# Patient Record
Sex: FEMALE | Race: OTHER | NOT HISPANIC OR LATINO
[De-identification: names, ages, dates, MRNs, and addresses within clinical notes are randomized per-mention and may not be internally consistent; named-entity substitution may affect disease eponyms.]

---

## 2017-10-19 PROBLEM — Z00.00 ENCOUNTER FOR PREVENTIVE HEALTH EXAMINATION: Status: ACTIVE | Noted: 2017-10-19

## 2017-10-24 ENCOUNTER — APPOINTMENT (OUTPATIENT)
Dept: VASCULAR SURGERY | Facility: CLINIC | Age: 62
End: 2017-10-24
Payer: COMMERCIAL

## 2017-10-24 VITALS
HEART RATE: 79 BPM | BODY MASS INDEX: 32.97 KG/M2 | HEIGHT: 59 IN | SYSTOLIC BLOOD PRESSURE: 111 MMHG | WEIGHT: 163.56 LBS | OXYGEN SATURATION: 99 % | DIASTOLIC BLOOD PRESSURE: 75 MMHG

## 2017-10-24 DIAGNOSIS — Z86.39 PERSONAL HISTORY OF OTHER ENDOCRINE, NUTRITIONAL AND METABOLIC DISEASE: ICD-10-CM

## 2017-10-24 DIAGNOSIS — Z84.89 FAMILY HISTORY OF OTHER SPECIFIED CONDITIONS: ICD-10-CM

## 2017-10-24 DIAGNOSIS — Z82.49 FAMILY HISTORY OF ISCHEMIC HEART DISEASE AND OTHER DISEASES OF THE CIRCULATORY SYSTEM: ICD-10-CM

## 2017-10-24 DIAGNOSIS — Z81.8 FAMILY HISTORY OF OTHER MENTAL AND BEHAVIORAL DISORDERS: ICD-10-CM

## 2017-10-24 DIAGNOSIS — Z82.0 FAMILY HISTORY OF EPILEPSY AND OTHER DISEASES OF THE NERVOUS SYSTEM: ICD-10-CM

## 2017-10-24 DIAGNOSIS — Z83.3 FAMILY HISTORY OF DIABETES MELLITUS: ICD-10-CM

## 2017-10-24 PROCEDURE — 29580 STRAPPING UNNA BOOT: CPT

## 2017-10-24 PROCEDURE — 93970 EXTREMITY STUDY: CPT

## 2017-10-24 PROCEDURE — 99244 OFF/OP CNSLTJ NEW/EST MOD 40: CPT | Mod: 25

## 2017-10-31 ENCOUNTER — APPOINTMENT (OUTPATIENT)
Dept: VASCULAR SURGERY | Facility: CLINIC | Age: 62
End: 2017-10-31
Payer: COMMERCIAL

## 2017-10-31 VITALS
DIASTOLIC BLOOD PRESSURE: 86 MMHG | HEART RATE: 76 BPM | SYSTOLIC BLOOD PRESSURE: 134 MMHG | OXYGEN SATURATION: 98 % | TEMPERATURE: 98.2 F

## 2017-10-31 PROCEDURE — 29580 STRAPPING UNNA BOOT: CPT | Mod: RT

## 2017-11-07 ENCOUNTER — APPOINTMENT (OUTPATIENT)
Dept: VASCULAR SURGERY | Facility: CLINIC | Age: 62
End: 2017-11-07
Payer: COMMERCIAL

## 2017-11-07 VITALS
DIASTOLIC BLOOD PRESSURE: 74 MMHG | HEART RATE: 64 BPM | SYSTOLIC BLOOD PRESSURE: 121 MMHG | OXYGEN SATURATION: 97 % | TEMPERATURE: 97.7 F

## 2017-11-07 DIAGNOSIS — I83.012 VARICOSE VEINS OF RIGHT LOWER EXTREMITY WITH ULCER OF CALF: ICD-10-CM

## 2017-11-07 DIAGNOSIS — M79.89 OTHER SPECIFIED SOFT TISSUE DISORDERS: ICD-10-CM

## 2017-11-07 PROCEDURE — 29580 STRAPPING UNNA BOOT: CPT | Mod: RT

## 2017-11-14 ENCOUNTER — APPOINTMENT (OUTPATIENT)
Dept: VASCULAR SURGERY | Facility: CLINIC | Age: 62
End: 2017-11-14
Payer: COMMERCIAL

## 2017-11-14 VITALS
SYSTOLIC BLOOD PRESSURE: 120 MMHG | OXYGEN SATURATION: 98 % | DIASTOLIC BLOOD PRESSURE: 75 MMHG | HEART RATE: 73 BPM | TEMPERATURE: 97.9 F

## 2017-11-14 DIAGNOSIS — L97.208 VARICOSE VEINS OF UNSPECIFIED LOWER EXTREMITY WITH ULCER OF CALF: ICD-10-CM

## 2017-11-14 DIAGNOSIS — M79.661 PAIN IN RIGHT LOWER LEG: ICD-10-CM

## 2017-11-14 DIAGNOSIS — I83.002 VARICOSE VEINS OF UNSPECIFIED LOWER EXTREMITY WITH ULCER OF CALF: ICD-10-CM

## 2017-11-14 DIAGNOSIS — M79.89 PAIN IN RIGHT LOWER LEG: ICD-10-CM

## 2017-11-14 PROCEDURE — 29580 STRAPPING UNNA BOOT: CPT | Mod: RT

## 2017-11-21 ENCOUNTER — LABORATORY RESULT (OUTPATIENT)
Age: 62
End: 2017-11-21

## 2017-11-21 ENCOUNTER — APPOINTMENT (OUTPATIENT)
Dept: VASCULAR SURGERY | Facility: CLINIC | Age: 62
End: 2017-11-21
Payer: COMMERCIAL

## 2017-11-21 VITALS
OXYGEN SATURATION: 98 % | DIASTOLIC BLOOD PRESSURE: 72 MMHG | TEMPERATURE: 98.4 F | HEART RATE: 75 BPM | SYSTOLIC BLOOD PRESSURE: 122 MMHG

## 2017-11-21 PROCEDURE — 37765 STAB PHLEB VEINS XTR 10-20: CPT | Mod: RT

## 2017-12-05 ENCOUNTER — APPOINTMENT (OUTPATIENT)
Dept: VASCULAR SURGERY | Facility: CLINIC | Age: 62
End: 2017-12-05
Payer: COMMERCIAL

## 2017-12-05 VITALS
TEMPERATURE: 98.6 F | HEART RATE: 91 BPM | DIASTOLIC BLOOD PRESSURE: 73 MMHG | SYSTOLIC BLOOD PRESSURE: 111 MMHG | OXYGEN SATURATION: 100 %

## 2017-12-05 DIAGNOSIS — L97.211 VARICOSE VEINS OF RIGHT LOWER EXTREMITY WITH ULCER OF CALF: ICD-10-CM

## 2017-12-05 DIAGNOSIS — I83.012 VARICOSE VEINS OF RIGHT LOWER EXTREMITY WITH ULCER OF CALF: ICD-10-CM

## 2017-12-05 PROCEDURE — 99024 POSTOP FOLLOW-UP VISIT: CPT

## 2018-01-04 ENCOUNTER — APPOINTMENT (OUTPATIENT)
Dept: SURGERY | Facility: CLINIC | Age: 63
End: 2018-01-04

## 2018-02-27 ENCOUNTER — APPOINTMENT (OUTPATIENT)
Dept: SURGERY | Facility: CLINIC | Age: 63
End: 2018-02-27
Payer: COMMERCIAL

## 2018-02-27 VITALS
BODY MASS INDEX: 34.22 KG/M2 | DIASTOLIC BLOOD PRESSURE: 81 MMHG | SYSTOLIC BLOOD PRESSURE: 132 MMHG | OXYGEN SATURATION: 98 % | HEIGHT: 58 IN | WEIGHT: 163 LBS | TEMPERATURE: 98 F

## 2018-02-27 PROCEDURE — 99203 OFFICE O/P NEW LOW 30 MIN: CPT

## 2018-03-19 ENCOUNTER — APPOINTMENT (OUTPATIENT)
Dept: SURGERY | Facility: CLINIC | Age: 63
End: 2018-03-19

## 2018-04-02 ENCOUNTER — APPOINTMENT (OUTPATIENT)
Dept: SURGERY | Facility: CLINIC | Age: 63
End: 2018-04-02
Payer: COMMERCIAL

## 2018-04-02 VITALS
HEIGHT: 58 IN | HEART RATE: 76 BPM | WEIGHT: 161 LBS | SYSTOLIC BLOOD PRESSURE: 110 MMHG | OXYGEN SATURATION: 98 % | BODY MASS INDEX: 33.8 KG/M2 | TEMPERATURE: 98.6 F | DIASTOLIC BLOOD PRESSURE: 75 MMHG

## 2018-04-02 DIAGNOSIS — F15.90 OTHER STIMULANT USE, UNSPECIFIED, UNCOMPLICATED: ICD-10-CM

## 2018-04-02 PROCEDURE — 99214 OFFICE O/P EST MOD 30 MIN: CPT

## 2018-04-06 PROBLEM — F15.90 CAFFEINE USE: Status: ACTIVE | Noted: 2018-02-27

## 2018-06-04 ENCOUNTER — APPOINTMENT (OUTPATIENT)
Dept: SURGERY | Facility: CLINIC | Age: 63
End: 2018-06-04
Payer: COMMERCIAL

## 2018-06-04 VITALS
SYSTOLIC BLOOD PRESSURE: 128 MMHG | BODY MASS INDEX: 33.85 KG/M2 | DIASTOLIC BLOOD PRESSURE: 79 MMHG | OXYGEN SATURATION: 96 % | HEIGHT: 58 IN | TEMPERATURE: 97.9 F | HEART RATE: 67 BPM | WEIGHT: 161.25 LBS

## 2018-06-04 PROCEDURE — 99214 OFFICE O/P EST MOD 30 MIN: CPT

## 2019-09-16 ENCOUNTER — LABORATORY RESULT (OUTPATIENT)
Age: 64
End: 2019-09-16

## 2019-09-16 ENCOUNTER — APPOINTMENT (OUTPATIENT)
Dept: SURGERY | Facility: CLINIC | Age: 64
End: 2019-09-16
Payer: COMMERCIAL

## 2019-09-16 VITALS
BODY MASS INDEX: 33.85 KG/M2 | HEIGHT: 58 IN | HEART RATE: 69 BPM | DIASTOLIC BLOOD PRESSURE: 79 MMHG | WEIGHT: 161.25 LBS | OXYGEN SATURATION: 95 % | TEMPERATURE: 97.8 F | SYSTOLIC BLOOD PRESSURE: 133 MMHG

## 2019-09-16 DIAGNOSIS — E88.1 LIPODYSTROPHY, NOT ELSEWHERE CLASSIFIED: ICD-10-CM

## 2019-09-16 PROCEDURE — 10121 INC&RMVL FB SUBQ TISS COMP: CPT

## 2019-10-02 PROBLEM — E88.1 ACQUIRED GENERALIZED LIPODYSTROPHY: Status: ACTIVE | Noted: 2017-11-07

## 2019-10-02 NOTE — PHYSICAL EXAM
[JVD] : no jugular venous distention  [Normal Breath Sounds] : Normal breath sounds [Normal Heart Sounds] : normal heart sounds [Abdominal Masses] : No abdominal masses [Abdomen Tenderness] : ~T ~M No abdominal tenderness [Tender] : was nontender [Enlarged] : not enlarged [Alert] : alert [Oriented to Person] : oriented to person [Oriented to Place] : oriented to place [Calm] : calm [Oriented to Time] : oriented to time [de-identified] : DANG. Bonifacio. Appropriate. Comfortable. [de-identified] : Pupil reactive. She has a prosthetic globe. No Scleral Icterus. NCAT. [de-identified] : Supple. Trachea midline. No overt lymphadenopathy. No JVD [de-identified] : Soft, non tender and non distended. 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 No overt cellulitis. Massive incisional hernia with poor skin quality and pannus.

## 2019-10-02 NOTE — ASSESSMENT
[FreeTextEntry1] : 63 year old female with complicated recurrent incisional hernia with diabetes, morbid obesity and unfavorable skin configuration. I commended her on her efforts and counseled her on the diabetes management however she has been unsuccessful in mitigating risk factors since last year.  She is working on this. We will be looking forward to her next HGbA1c. In terms of her weight loss I have re- connected her with our nutritionist here today Adore Laguna. We did discuss that risk mitigation is very important. Today on exam I noted a small piece of foreign body associated with one of the sinues. I have taken the opportunity to sample and culture as this data may one day be important should she develop illness related to the sinues. . I answered all questions. She asked me to speak with her sister on the telephone today which I did in her presence. \par \par Procedure:\par Verbal consent obtained from patient\par Discussed risk,benefits\par Site personal time out\par Area prepped with alcohol at draining sinus.\par Patient without sensation in the area. No local used.\par Small protrustion of prosthetic mesh removed sharply with #11Blade\par Sent for Cx\par Some purulence extruded \par \par She expressed understanding. Notably greater than 50% of todays 45 minute follow up visit was spent on counseling and coordination of her care. She knows she needs to lose signficant weight and get back on track with her diabetes management. Her current risk of surgical complication approaches 90 percent for elective reconstruction.

## 2019-10-02 NOTE — HISTORY OF PRESENT ILLNESS
[de-identified] : This is a 62 year old female with multiple medical comorbid conditions including morbid obesity and very poorly controlled diabetes, not on insulin. She was seen here by Dr. Smith who is no longer with the practice having moved to [place]. She is here to seek consultation from me. She reports a protracted and prolonged history of drainig sinus of the anterior abdominal wall with multiple prior repairs in the past, all having failed. She has had a chronic sinus, infection for years. She was sent for CT scan by Dr. Smith, and I am able to review the report however no images have been provided to me. Overall she reports normal bowel function. She reports she has been turned away at other surgical practices for treatment as she has been deemed high risk. Today we spent the majority of the visit discussing his and risk mitigation.. [de-identified] : June 4th, 2018. Patient seen and examined. In the interning time, I did discuss her care plan with her PCP. He has started her on insulin. It is still too early to tell how she is doing with regard to her HGbA1c however the patient reports that her fingersticks have been better controlled. Currently she is missing her equipment and will be ordering more. With regard to her weight she discussed a lower carb diet with our nutritionist but admittedly has not been strict. She returns today and unfortunately has not lost any weight. With regard to the hernia, she has no new issues. The hernia continued to drain, and she does not have any obstructive symptoms. \par \par September 16th, 2019. Patient returns today. She reports that she has not been as compliant with antidiabetic therapy as she could be, has been stressed with work and with personal responsibilities. No obstructive symptoms. She has been unable to lose weight or otherwise reduce risk factors for surgery. She continues to have drainage from the chronic sinus/fistula to her mesh in the abdominal wall.

## 2021-04-19 ENCOUNTER — RESULT REVIEW (OUTPATIENT)
Age: 66
End: 2021-04-19

## 2021-04-19 ENCOUNTER — APPOINTMENT (OUTPATIENT)
Dept: SURGERY | Facility: CLINIC | Age: 66
End: 2021-04-19
Payer: MEDICARE

## 2021-04-19 VITALS
SYSTOLIC BLOOD PRESSURE: 148 MMHG | HEIGHT: 58 IN | BODY MASS INDEX: 38.07 KG/M2 | WEIGHT: 181.38 LBS | HEART RATE: 107 BPM | TEMPERATURE: 97.2 F | OXYGEN SATURATION: 97 % | DIASTOLIC BLOOD PRESSURE: 91 MMHG

## 2021-04-19 DIAGNOSIS — R10.9 UNSPECIFIED ABDOMINAL PAIN: ICD-10-CM

## 2021-04-19 DIAGNOSIS — L90.5 SCAR CONDITIONS AND FIBROSIS OF SKIN: ICD-10-CM

## 2021-04-19 PROCEDURE — 99213 OFFICE O/P EST LOW 20 MIN: CPT

## 2021-04-20 ENCOUNTER — OUTPATIENT (OUTPATIENT)
Dept: OUTPATIENT SERVICES | Facility: HOSPITAL | Age: 66
LOS: 1 days | End: 2021-04-20
Payer: MEDICARE

## 2021-04-20 ENCOUNTER — APPOINTMENT (OUTPATIENT)
Dept: CT IMAGING | Facility: HOSPITAL | Age: 66
End: 2021-04-20
Payer: MEDICARE

## 2021-04-20 PROCEDURE — 74177 CT ABD & PELVIS W/CONTRAST: CPT

## 2021-04-20 PROCEDURE — G1004: CPT

## 2021-04-20 PROCEDURE — 74177 CT ABD & PELVIS W/CONTRAST: CPT | Mod: 26,ME

## 2021-04-21 ENCOUNTER — APPOINTMENT (OUTPATIENT)
Dept: BARIATRICS | Facility: CLINIC | Age: 66
End: 2021-04-21
Payer: MEDICARE

## 2021-04-21 VITALS
WEIGHT: 181 LBS | TEMPERATURE: 97.2 F | HEIGHT: 58 IN | HEART RATE: 79 BPM | DIASTOLIC BLOOD PRESSURE: 81 MMHG | OXYGEN SATURATION: 97 % | SYSTOLIC BLOOD PRESSURE: 133 MMHG | BODY MASS INDEX: 37.99 KG/M2

## 2021-04-21 DIAGNOSIS — T85.79XA INFECTION AND INFLAMMATORY REACTION DUE TO OTHER INTERNAL PROSTHETIC DEVICES, IMPLANTS AND GRAFTS, INITIAL ENCOUNTER: ICD-10-CM

## 2021-04-21 PROCEDURE — 99213 OFFICE O/P EST LOW 20 MIN: CPT

## 2021-04-29 NOTE — HISTORY OF PRESENT ILLNESS
[de-identified] : This is a 62 year old female with multiple medical comorbid conditions including morbid obesity and very poorly controlled diabetes, not on insulin. She was seen here by Dr. Smith who is no longer with the practice having moved to [place]. She is here to seek consultation from me. She reports a protracted and prolonged history of drainig sinus of the anterior abdominal wall with multiple prior repairs in the past, all having failed. She has had a chronic sinus, infection for years. She was sent for CT scan by Dr. Smith, and I am able to review the report however no images have been provided to me. Overall she reports normal bowel function. She reports she has been turned away at other surgical practices for treatment as she has been deemed high risk. Today we spent the majority of the visit discussing his and risk mitigation.. [de-identified] : June 4th, 2018. Patient seen and examined. In the interning time, I did discuss her care plan with her PCP. He has started her on insulin. It is still too early to tell how she is doing with regard to her HGbA1c however the patient reports that her fingersticks have been better controlled. Currently she is missing her equipment and will be ordering more. With regard to her weight she discussed a lower carb diet with our nutritionist but admittedly has not been strict. She returns today and unfortunately has not lost any weight. With regard to the hernia, she has no new issues. The hernia continued to drain, and she does not have any obstructive symptoms. \par \par September 16th, 2019. Patient returns today. She reports that she has not been as compliant with antidiabetic therapy as she could be, has been stressed with work and with personal responsibilities. No obstructive symptoms. She has been unable to lose weight or otherwise reduce risk factors for surgery. She continues to have drainage from the chronic sinus/fistula to her mesh in the abdominal wall. \par \par 4- - Patient returns today. Reports having had a difficult year of pandemic. Lost her mother and her brother recently. Now is living alone. Also was forced into earlier group home than she initially  has planned. Continues to struggle with weight. Continues to have issue related to mesh infection and loss of abdominal domain. Weight heaing in wrong direction. She continues to work on her diabetes.

## 2021-04-29 NOTE — ASSESSMENT
[FreeTextEntry1] : 63 year old female with complicated recurrent incisional hernia with diabetes, morbid obesity and unfavorable skin configuration. I commended her on her efforts and counseled her on the diabetes management however she has been unsuccessful in mitigating risk factors since last year.  She is working on this. \par \par Discussed that weight loss is imperative as at current weight massive abdominal wall reconstruction is very risky. Will consider weight loss surgery.

## 2021-04-29 NOTE — PHYSICAL EXAM
[Normal Breath Sounds] : Normal breath sounds [Normal Heart Sounds] : normal heart sounds [Alert] : alert [Oriented to Person] : oriented to person [Oriented to Place] : oriented to place [Oriented to Time] : oriented to time [Calm] : calm [JVD] : no jugular venous distention  [Abdominal Masses] : No abdominal masses [Abdomen Tenderness] : ~T ~M No abdominal tenderness [Tender] : was nontender [Enlarged] : not enlarged [de-identified] : DANG. Bonifacio. Appropriate. Comfortable. [de-identified] : Pupil reactive. She has a prosthetic globe. No Scleral Icterus. NCAT. [de-identified] : Supple. Trachea midline. No overt lymphadenopathy. No JVD [de-identified] : Soft, non tender and non distended. 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 No overt cellulitis. Massive incisional hernia with poor skin quality and pannus.

## 2021-05-01 NOTE — PHYSICAL EXAM
[Obese, well nourished, in no acute distress] : obese, well nourished, in no acute distress [Normal] : affect appropriate [de-identified] : obese, giant ventral hernia involving her entire left side of her abdomen with complete loss of domain, oozing fistula on right side of the abdomen with purulent drainage (form chronically infected mesh) [de-identified] : as above

## 2021-05-01 NOTE — HISTORY OF PRESENT ILLNESS
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 She is referred to bariatric surgery for potential weight reduction to increase domain to allow body wall reconstruction. While we completely agree with these general principles, we believe that the severity, loss of domain and absence of muscle structure to anchor to makes it highly unlikely that we will be able to accomplish these goals. Additionally, on exam, Sylvia actually has very little fat deposition in her arms and legs, already is having kyphoscoliosis of her spine and has the distribution of having effects of insulin therapy. Remarkably, she is able to walk, lead an active lifestyle, and her major complaint is the dependency of her intraabdominal cavity on her lower extremities. I think surgical intervention with a sleeve gastrectomy alone would probably have nominal weight loss and to add an intestinal conduit wouldn't be beneficial because I think her bone structure is already compromised. As we have explained to her and her sister, we believe that any surgical intervention would at least have an even chance of making her worse and causing problems with wound healing than actually improving her functional status. The realistic goal has to be to keep her functioning, which she is doing now, without subjecting her to unnecessary risk and think that the best mechanisms to do this would be to have her fitted for a proper body suit that could provide support and combine this with adjustment of DM medications to, hopefully, wean insulin and begin GLP analog along with Metformin to encourage weight loss and then she should continue to be followed. I only know of one company in [facility] that makes body suits I'm describing but patient should scan internet and we will do research. I think the combination of bracing, manual compression, controlling the sinous track and altering medications is preferable to surgery because I think surgical risk is high and distribution leads me to believe that weight is due to medication rather than true adiposity due to excess calories.

## 2021-05-01 NOTE — REASON FOR VISIT
[Initial Consult] : an initial consult for [Morbid Obesity (BMI>40)] : morbid obesity (bmi>40) [Other___] : [unfilled]

## 2021-05-01 NOTE — REVIEW OF SYSTEMS
[Abdominal Pain] : no abdominal pain [Vomiting] : no vomiting [Constipation] : no constipation [Diarrhea] : no diarrhea [Reflux/Heartburn] : no reflux/ heartburn [Hernia] : hernia [Negative] : Psychiatric

## 2021-07-26 ENCOUNTER — APPOINTMENT (OUTPATIENT)
Dept: SURGERY | Facility: CLINIC | Age: 66
End: 2021-07-26
Payer: MEDICARE

## 2021-07-26 VITALS
TEMPERATURE: 97.2 F | DIASTOLIC BLOOD PRESSURE: 74 MMHG | BODY MASS INDEX: 39.47 KG/M2 | HEIGHT: 58 IN | WEIGHT: 188 LBS | SYSTOLIC BLOOD PRESSURE: 115 MMHG | OXYGEN SATURATION: 97 %

## 2021-07-26 DIAGNOSIS — N63.0 UNSPECIFIED LUMP IN UNSPECIFIED BREAST: ICD-10-CM

## 2021-07-26 PROCEDURE — 99214 OFFICE O/P EST MOD 30 MIN: CPT

## 2021-07-26 PROCEDURE — 99072 ADDL SUPL MATRL&STAF TM PHE: CPT

## 2021-07-27 NOTE — ASSESSMENT
[FreeTextEntry1] : 63 year old female with complicated recurrent incisional hernia with diabetes, morbid obesity and unfavorable skin configuration. I commended her on her efforts and counseled her on the diabetes management however she has been unsuccessful in mitigating risk factors since last year.  She is working on this. \par \par Discussed that weight loss is imperative as at current weight massive abdominal wall reconstruction is very risky and she is not a candidate until improvement. I have recommend seeing Dr. Gotti with endocrine reiterating Dr. Wsetfall sentiment for some advice and management of this. Have recommended consultation with breast expert Dr. Brady to have the nodule evaluated closely.

## 2021-07-27 NOTE — PHYSICAL EXAM
[JVD] : no jugular venous distention  [Normal Breath Sounds] : Normal breath sounds [Normal Heart Sounds] : normal heart sounds [Abdominal Masses] : No abdominal masses [Abdomen Tenderness] : ~T ~M No abdominal tenderness [Tender] : was nontender [Enlarged] : not enlarged [Alert] : alert [Oriented to Person] : oriented to person [Oriented to Place] : oriented to place [Oriented to Time] : oriented to time [Calm] : calm [de-identified] : DANG. Bonifacio. Appropriate. Comfortable. [de-identified] : Pupil reactive. She has a prosthetic globe. No Scleral Icterus. NCAT. [de-identified] : Supple. Trachea midline. No overt lymphadenopathy. No JVD [de-identified] : Soft, non tender and non distended. 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 No overt cellulitis. Massive incisional hernia with poor skin quality and pannus.

## 2021-07-27 NOTE — HISTORY OF PRESENT ILLNESS
[de-identified] : This is a 62 year old female with multiple medical comorbid conditions including morbid obesity and very poorly controlled diabetes, not on insulin. She was seen here by Dr. Smith who is no longer with the practice having moved to [place]. She is here to seek consultation from me. She reports a protracted and prolonged history of drainig sinus of the anterior abdominal wall with multiple prior repairs in the past, all having failed. She has had a chronic sinus, infection for years. She was sent for CT scan by Dr. Smith, and I am able to review the report however no images have been provided to me. Overall she reports normal bowel function. She reports she has been turned away at other surgical practices for treatment as she has been deemed high risk. Today we spent the majority of the visit discussing his and risk mitigation.. [de-identified] : June 4th, 2018. Patient seen and examined. In the interning time, I did discuss her care plan with her PCP. He has started her on insulin. It is still too early to tell how she is doing with regard to her HGbA1c however the patient reports that her fingersticks have been better controlled. Currently she is missing her equipment and will be ordering more. With regard to her weight she discussed a lower carb diet with our nutritionist but admittedly has not been strict. She returns today and unfortunately has not lost any weight. With regard to the hernia, she has no new issues. The hernia continued to drain, and she does not have any obstructive symptoms. \par \par September 16th, 2019. Patient returns today. She reports that she has not been as compliant with antidiabetic therapy as she could be, has been stressed with work and with personal responsibilities. No obstructive symptoms. She has been unable to lose weight or otherwise reduce risk factors for surgery. She continues to have drainage from the chronic sinus/fistula to her mesh in the abdominal wall. \par \par 4- - Patient returns today. Reports having had a difficult year of pandemic. Lost her mother and her brother recently. Now is living alone. Also was forced into earlier [facility] than she initially  has planned. Continues to struggle with weight. Continues to have issue related to mesh infection and loss of abdominal domain. Weight heaing in wrong direction. She continues to work on her diabetes. \par \par 7- - Returns today. Continues to have some difficulty with weight loss. No major changes to hernia. We discussed CT scan findings in detail. Large complex abdominal wall hernia remains. She has chronic draining sinus secondary to infected mesh. She does continue to struggle with DM per the patient. We discussed her breast lesion seen on CT. She reports she is up to date with breast health, however I have recommended strongly that she schedule appointment with breast health clinic as they focus solely on these issues, have recommended Dr. Brady.

## 2021-07-28 RX ORDER — ADHESIVE TAPE 3"X 2.3 YD
4"X4" TAPE, NON-MEDICATED TOPICAL
Qty: 1 | Refills: 0 | Status: ACTIVE | COMMUNITY
Start: 2021-07-28 | End: 1900-01-01

## 2021-08-04 ENCOUNTER — APPOINTMENT (OUTPATIENT)
Dept: ENDOCRINOLOGY | Facility: CLINIC | Age: 66
End: 2021-08-04

## 2021-10-06 PROBLEM — I83.002: Status: ACTIVE | Noted: 2017-10-24

## 2021-12-02 ENCOUNTER — APPOINTMENT (OUTPATIENT)
Dept: VASCULAR SURGERY | Facility: CLINIC | Age: 66
End: 2021-12-02
Payer: MEDICARE

## 2021-12-02 VITALS
DIASTOLIC BLOOD PRESSURE: 82 MMHG | OXYGEN SATURATION: 95 % | TEMPERATURE: 98.2 F | SYSTOLIC BLOOD PRESSURE: 121 MMHG | HEART RATE: 90 BPM

## 2021-12-02 PROCEDURE — 93970 EXTREMITY STUDY: CPT

## 2021-12-02 PROCEDURE — 99203 OFFICE O/P NEW LOW 30 MIN: CPT | Mod: 25

## 2021-12-02 NOTE — HISTORY OF PRESENT ILLNESS
[FreeTextEntry1] : RVT performed b/l vle today which shows b/l gsv reflux no lsv reflux no dvt no svt

## 2021-12-02 NOTE — PROCEDURE
[FreeTextEntry1] : Vascular surgeon discussed with the patient:\par Varicose veins are enlarged, twisted veins. Varicose veins are caused by increased blood pressure in the veins.  The blood moves towards the heart by 1-way valves in the veins. When the valves become weakened or damaged, blood can collect in the veins. This causes the veins to become enlarged. Sitting or standing for long periods can cause blood to pool in the leg veins, increasing the pressure within the veins. The veins can stretch from the increased pressure. This may weaken the walls of the veins and damage the valves.\par Varicose veins may be more common in some families (inherited).  Factors that may increase pressure include:  obesity, older age, being female, pregnancy, taking oral contraceptive pills or hormone replacement, being inactive, and/or smoking. \par The most common symptoms of varicose veins are sensations in the legs, such as a heavy feeling, burning, and/or aching. However, each individual may experience symptoms differently.  Other symptoms may include:  color changes in the skin, sores on the legs, or rash.  Severe varicose veins may eventually produce long-term mild swelling that can result in more serious skin and tissue problems, such as ulcers and non-healing sores.\par Varicose veins are diagnosed by a complete medical history, physical examination, and diagnostic studies for varicose veins including duplex ultrasound and color-flow imaging.  \par Medical treatment for varicose veins include:  compression stockings, sclerotherapy, endovenous laser ablation and/or surgical treatment microphlebectomy.  \par \par \par Pt given Rx for thigh high support hose 20-30 mmhg to wear daily - \par pt will call to schedule b/l gsv RFA.

## 2021-12-02 NOTE — PHYSICAL EXAM
[JVD] : no jugular venous distention  [2+] : left 2+ [Ankle Swelling (On Exam)] : present [Ankle Swelling On The Left] : moderate [Varicose Veins Of Lower Extremities] : bilaterally [] : bilaterally [Ankle Swelling Bilaterally] : severe [Purpura] : no purpura  [Petechiae] : no petechiae [Skin Ulcer] : no ulcer [Skin Induration] : induration [Alert] : alert [Oriented to Person] : oriented to person [Oriented to Place] : oriented to place [Oriented to Time] : oriented to time [Calm] : calm [de-identified] : wdwn nad [de-identified] : wnl [FreeTextEntry1] : diffuse large varicose veins b.l legs, severe venous dermatitis especially left leg, resolving left leg cellulitis overlying dermatitis, no open venous ulcers [de-identified] : wnl [de-identified] : as above

## 2021-12-02 NOTE — REASON FOR VISIT
[Initial Evaluation] : an initial evaluation [FreeTextEntry1] : Pt last seen 4 years ago for b/l severe venous reflux disease - she was recommended to undergo ablation procedure at that time but was unable to schedule - she returns now with complaint of progressive and worsening severe b/l gsv reflux with ache pain swelling fatigure heaviness cramps swelling severe dermatitis especially left lower leg for which her PCP is treating her with po antibiotics.

## 2022-01-26 ENCOUNTER — APPOINTMENT (OUTPATIENT)
Dept: VASCULAR SURGERY | Facility: CLINIC | Age: 67
End: 2022-01-26

## 2022-03-08 RX ORDER — ADHESIVE TAPE 0.5"X360"
TAPE, NON-MEDICATED TOPICAL
Qty: 3 | Refills: 0 | Status: ACTIVE | COMMUNITY
Start: 2021-08-05

## 2022-03-10 ENCOUNTER — APPOINTMENT (OUTPATIENT)
Dept: VASCULAR SURGERY | Facility: CLINIC | Age: 67
End: 2022-03-10
Payer: MEDICARE

## 2022-03-10 VITALS
TEMPERATURE: 97.6 F | HEART RATE: 74 BPM | SYSTOLIC BLOOD PRESSURE: 135 MMHG | DIASTOLIC BLOOD PRESSURE: 84 MMHG | OXYGEN SATURATION: 96 %

## 2022-03-10 DIAGNOSIS — I83.11 VARICOSE VEINS OF RIGHT LOWER EXTREMITY WITH INFLAMMATION: ICD-10-CM

## 2022-03-10 DIAGNOSIS — I83.12 VARICOSE VEINS OF RIGHT LOWER EXTREMITY WITH INFLAMMATION: ICD-10-CM

## 2022-03-10 PROCEDURE — 93970 EXTREMITY STUDY: CPT

## 2022-03-10 PROCEDURE — 99213 OFFICE O/P EST LOW 20 MIN: CPT | Mod: 25

## 2022-03-10 NOTE — PROCEDURE
[FreeTextEntry1] : pt to see dr barrett dermatology tomorrow and will continue po abx that her PCP started

## 2022-03-10 NOTE — VITALS
[Dull] : dull [Aching] : aching [Throbbing] : throbbing [Tender] : tender [Gnawing] : gnawing [Miserable] : miserable [FreeTextEntry3] : both legs and arms affected skin

## 2022-03-10 NOTE — PHYSICAL EXAM
[JVD] : no jugular venous distention  [2+] : left 2+ [Ankle Swelling (On Exam)] : present [Ankle Swelling On The Left] : moderate [Varicose Veins Of Lower Extremities] : bilaterally [] : bilaterally [Ankle Swelling Bilaterally] : severe [Purpura] : no purpura  [Petechiae] : no petechiae [Skin Ulcer] : no ulcer [Skin Induration] : induration [Alert] : alert [Oriented to Person] : oriented to person [Oriented to Place] : oriented to place [Oriented to Time] : oriented to time [Calm] : calm [de-identified] : wdwn nad [de-identified] : wnl [FreeTextEntry1] : diffuse large varicose veins b.l legs, severe venous dermatitis especially left leg, and both arms [de-identified] : wnl [de-identified] : as above

## 2022-03-11 ENCOUNTER — APPOINTMENT (OUTPATIENT)
Dept: DERMATOLOGY | Facility: CLINIC | Age: 67
End: 2022-03-11
Payer: MEDICARE

## 2022-03-11 PROCEDURE — 99204 OFFICE O/P NEW MOD 45 MIN: CPT

## 2022-03-11 NOTE — HISTORY OF PRESENT ILLNESS
[FreeTextEntry1] : rash [de-identified] : rash legs\par ?related to compression stockings - wore it once - afterwards a lot of pain and had issues since then\par Dec 12\par had a little rash before\par discoloration after years of scratching/rubbing

## 2022-03-11 NOTE — PHYSICAL EXAM
[FreeTextEntry3] : legs b/l depigmentation b/l shins\par erythema, scale, lichenification b/l legs L>R (L more swelling)\par lichenification erosions b/l forearms

## 2022-03-11 NOTE — ASSESSMENT
[FreeTextEntry1] : Atopic dermatitis\par and stasis dermatitis\par severe\par depigmentation likely from chronic scratching/rubbing\par rx TAC 0.1% oint\par discussed moisturizers and gentle washes

## 2022-04-06 ENCOUNTER — APPOINTMENT (OUTPATIENT)
Dept: DERMATOLOGY | Facility: CLINIC | Age: 67
End: 2022-04-06

## 2022-04-22 ENCOUNTER — APPOINTMENT (OUTPATIENT)
Dept: SURGERY | Facility: CLINIC | Age: 67
End: 2022-04-22
Payer: MEDICARE

## 2022-04-22 VITALS
HEART RATE: 98 BPM | DIASTOLIC BLOOD PRESSURE: 85 MMHG | TEMPERATURE: 97.1 F | SYSTOLIC BLOOD PRESSURE: 153 MMHG | OXYGEN SATURATION: 96 % | BODY MASS INDEX: 40.09 KG/M2 | HEIGHT: 58 IN | WEIGHT: 191 LBS

## 2022-04-22 PROCEDURE — 99212 OFFICE O/P EST SF 10 MIN: CPT

## 2022-04-22 RX ORDER — GAUZE BANDAGE 2" X 2"
4"X4" BANDAGE TOPICAL
Qty: 5 | Refills: 0 | Status: ACTIVE | COMMUNITY
Start: 2022-04-22 | End: 1900-01-01

## 2022-04-22 RX ORDER — ADHESIVE TAPE 3"X 2.3 YD
4"X4" TAPE, NON-MEDICATED TOPICAL
Qty: 2 | Refills: 0 | Status: ACTIVE | COMMUNITY
Start: 2022-04-22 | End: 1900-01-01

## 2022-04-22 RX ORDER — ADHESIVE TAPE 1"X198"
TAPE, NON-MEDICATED TOPICAL
Qty: 5 | Refills: 0 | Status: ACTIVE | COMMUNITY
Start: 2022-04-22 | End: 1900-01-01

## 2022-04-26 NOTE — ASSESSMENT
[FreeTextEntry1] : Case discussed/pt seen with attending, . 66 year old female with complicated recurrent incisional hernia with diabetes, morbid obesity and unfavorable skin configuration. We discussed need for diabetes and weight management, she has not yet seen an endocrinology d/t provided not accepting her insurance. Referral provided today with different endocrinologist options. Provided her with breast surgery referral for further evaluation of breast nodule found on CT scan. Again we discussed that weight loss is needed prior to discussion of surgery as she is at high risk at her current weight for a massive abdominal wall reconstruction. She expressed understanding. Prescription sent for gauze/bandages per pts request. She will RTC in 6 months or sooner if needed for weight check. All questions answered. \par \par

## 2022-04-26 NOTE — HISTORY OF PRESENT ILLNESS
[de-identified] : This is a 62 year old female with multiple medical comorbid conditions including morbid obesity and very poorly controlled diabetes, not on insulin. She was seen here by Dr. Smith who is no longer with the practice having moved to [place]. She is here to seek consultation from me. She reports a protracted and prolonged history of drainig sinus of the anterior abdominal wall with multiple prior repairs in the past, all having failed. She has had a chronic sinus, infection for years. She was sent for CT scan by Dr. Smith, and I am able to review the report however no images have been provided to me. Overall she reports normal bowel function. She reports she has been turned away at other surgical practices for treatment as she has been deemed high risk. Today we spent the majority of the visit discussing his and risk mitigation.. [de-identified] : June 4th, 2018. Patient seen and examined. In the interning time, I did discuss her care plan with her PCP. He has started her on insulin. It is still too early to tell how she is doing with regard to her HGbA1c however the patient reports that her fingersticks have been better controlled. Currently she is missing her equipment and will be ordering more. With regard to her weight she discussed a lower carb diet with our nutritionist but admittedly has not been strict. She returns today and unfortunately has not lost any weight. With regard to the hernia, she has no new issues. The hernia continued to drain, and she does not have any obstructive symptoms. \par \par September 16th, 2019. Patient returns today. She reports that she has not been as compliant with antidiabetic therapy as she could be, has been stressed with work and with personal responsibilities. No obstructive symptoms. She has been unable to lose weight or otherwise reduce risk factors for surgery. She continues to have drainage from the chronic sinus/fistula to her mesh in the abdominal wall. \par \par 4- - Patient returns today. Reports having had a difficult year of pandemic. Lost her mother and her brother recently. Now is living alone. Also was forced into earlier residential than she initially  has planned. Continues to struggle with weight. Continues to have issue related to mesh infection and loss of abdominal domain. Weight heaing in wrong direction. She continues to work on her diabetes. \par \par 7- - Returns today. Continues to have some difficulty with weight loss. No major changes to hernia. We discussed CT scan findings in detail. Large complex abdominal wall hernia remains. She has chronic draining sinus secondary to infected mesh. She does continue to struggle with DM per the patient. We discussed her breast lesion seen on CT. She reports she is up to date with breast health, however I have recommended strongly that she schedule appointment with breast health clinic as they focus solely on these issues, have recommended Dr. Brady. \par \par 4-: Returns to office. Overall doing well. Reports no changes to hernia. She has not lost any weight, admits to not being on any diet or trying to increase exercise. Discussed she has no pain or discomfort. We discussed breast lesion seen on CT again, she reports she recently had a mammogram 4 months ago. She does not have reports with her. Reports she has not seen an endocrinology, attempted to see one however office did not accept her insurance. Has not seen ENT, however continues to struggle with loss of smell and chronic sinus infection. Reports no issues with bowel movements or voiding.

## 2022-04-26 NOTE — PHYSICAL EXAM
[Normal Breath Sounds] : Normal breath sounds [Normal Heart Sounds] : normal heart sounds [Alert] : alert [Oriented to Person] : oriented to person [Oriented to Place] : oriented to place [Oriented to Time] : oriented to time [Calm] : calm [JVD] : no jugular venous distention  [Abdominal Masses] : No abdominal masses [Abdomen Tenderness] : ~T ~M No abdominal tenderness [Tender] : was nontender [Enlarged] : not enlarged [de-identified] : DANG. Bonifacio. Appropriate. Comfortable. [de-identified] : Pupil reactive. She has a prosthetic globe. No Scleral Icterus. NCAT. [de-identified] : Supple. Trachea midline. No overt lymphadenopathy. No JVD [de-identified] : Soft, non tender and non distended. 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 No overt cellulitis. Massive incisional hernia with poor skin quality and pannus.

## 2022-04-27 ENCOUNTER — OUTPATIENT (OUTPATIENT)
Dept: OUTPATIENT SERVICES | Facility: HOSPITAL | Age: 67
LOS: 1 days | End: 2022-04-27
Payer: MEDICARE

## 2022-04-27 ENCOUNTER — APPOINTMENT (OUTPATIENT)
Dept: CT IMAGING | Facility: HOSPITAL | Age: 67
End: 2022-04-27

## 2022-04-27 ENCOUNTER — APPOINTMENT (OUTPATIENT)
Dept: OTOLARYNGOLOGY | Facility: CLINIC | Age: 67
End: 2022-04-27
Payer: MEDICARE

## 2022-04-27 VITALS
WEIGHT: 193 LBS | HEART RATE: 86 BPM | BODY MASS INDEX: 40.51 KG/M2 | TEMPERATURE: 96.4 F | SYSTOLIC BLOOD PRESSURE: 139 MMHG | DIASTOLIC BLOOD PRESSURE: 96 MMHG | HEIGHT: 58 IN

## 2022-04-27 DIAGNOSIS — R42 DIZZINESS AND GIDDINESS: ICD-10-CM

## 2022-04-27 DIAGNOSIS — R22.1 LOCALIZED SWELLING, MASS AND LUMP, NECK: ICD-10-CM

## 2022-04-27 DIAGNOSIS — Z97.0 PRESENCE OF ARTIFICIAL EYE: ICD-10-CM

## 2022-04-27 DIAGNOSIS — J34.2 DEVIATED NASAL SEPTUM: ICD-10-CM

## 2022-04-27 DIAGNOSIS — Z92.29 PERSONAL HISTORY OF OTHER DRUG THERAPY: ICD-10-CM

## 2022-04-27 DIAGNOSIS — R43.0 ANOSMIA: ICD-10-CM

## 2022-04-27 PROCEDURE — 70486 CT MAXILLOFACIAL W/O DYE: CPT | Mod: ME

## 2022-04-27 PROCEDURE — 70486 CT MAXILLOFACIAL W/O DYE: CPT | Mod: 26,ME

## 2022-04-27 PROCEDURE — 31231 NASAL ENDOSCOPY DX: CPT

## 2022-04-27 PROCEDURE — G1004: CPT

## 2022-04-27 PROCEDURE — 70490 CT SOFT TISSUE NECK W/O DYE: CPT | Mod: 26,ME

## 2022-04-27 PROCEDURE — 99204 OFFICE O/P NEW MOD 45 MIN: CPT | Mod: 25

## 2022-04-27 PROCEDURE — 70490 CT SOFT TISSUE NECK W/O DYE: CPT | Mod: ME

## 2022-04-27 RX ORDER — GAUZE BANDAGE 4" X 4"
4"X4" BANDAGE TOPICAL
Qty: 5 | Refills: 2 | Status: ACTIVE | COMMUNITY
Start: 2022-04-27 | End: 1900-01-01

## 2022-04-27 RX ORDER — ADHESIVE TAPE 3"X 2.3 YD
4"X4" TAPE, NON-MEDICATED TOPICAL
Qty: 2 | Refills: 0 | Status: ACTIVE | COMMUNITY
Start: 2022-04-27 | End: 1900-01-01

## 2022-04-27 RX ORDER — TRIAMCINOLONE ACETONIDE 55 UG/1
55 SOLUTION/ DROPS OPHTHALMIC
Qty: 1 | Refills: 2 | Status: ACTIVE | COMMUNITY
Start: 2022-04-27 | End: 1900-01-01

## 2022-04-29 ENCOUNTER — APPOINTMENT (OUTPATIENT)
Dept: DERMATOLOGY | Facility: CLINIC | Age: 67
End: 2022-04-29

## 2022-05-05 ENCOUNTER — NON-APPOINTMENT (OUTPATIENT)
Age: 67
End: 2022-05-05

## 2022-05-06 ENCOUNTER — APPOINTMENT (OUTPATIENT)
Dept: ENDOCRINOLOGY | Facility: CLINIC | Age: 67
End: 2022-05-06
Payer: MEDICARE

## 2022-05-06 VITALS
HEART RATE: 94 BPM | SYSTOLIC BLOOD PRESSURE: 150 MMHG | BODY MASS INDEX: 39.71 KG/M2 | WEIGHT: 190 LBS | DIASTOLIC BLOOD PRESSURE: 92 MMHG

## 2022-05-06 PROCEDURE — 82962 GLUCOSE BLOOD TEST: CPT

## 2022-05-06 PROCEDURE — 99205 OFFICE O/P NEW HI 60 MIN: CPT | Mod: 25

## 2022-05-06 NOTE — HISTORY OF PRESENT ILLNESS
[FreeTextEntry1] : 65 y/o female pt, with /92, BMI 39.71, and POCT 138 with Hx of T2DM (dx approx. 16 years ago), with no known DM related complications, presents today for endocrine evaluation. \par Pt has Hx of multiple abdominal surgical repairs. Hx of chronic sinus infection. \par Other PMHx: L hand carpal tunnel syndrome (dx 10 years ago, took steroids for "short period of time"), abdominal hernia, vitiligo \par PSHx: hysterectomy, R leg varicose vein surgery (5 years ago, "18 veins removed from R leg")\par FHx: DM (mother, father, 1 brother)\par No FHx of premature heart disease. Pt has 1 living sibling.\par SHx:non-smoker, no etoh use \par Last funduscopic visit: 3/2022. 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 Pt was informed she does not have diabetic retinopathy. \par Last dental visit: 2020 \par Allergies: Penicillin \par No children. Pt does not remember age of menopause. \par Pt has received 3 doses of COVID vaccine. \par \par 05/06/2022\par Pt presents today feeling well with c/o elevation in BS for the past couple weeks (BS in the 300-400s) and states her insulin injection pen was not turning enough and giving her the full dosage.She has been taking Lantus for 3-4 years. Pt checks BS 2-3 times per day, when she wakes up and before she goes to bed at night. She has never been hospitalized due to DM complications.\par Pt states that since the COVID19 pandemic began, she stopped working. She used to walk 3 miles qd and weight was 157 lb. She gained 36 lb over the pandemic, does not drink as much water as before, becomes tired quickly and has SOB when walking. She goes to bed at 4am and has difficulty falling asleep. Her internist advised her to take sleep medication but pt would prefer to not take medication. Pt admits she does not take her other prescribed medication at a consistent time every day. \par Pt also notes pain near ocular prosthesis R eye and states her waist hurts but attributes it abdominal hernia. \par Denies blurred vision, weight loss, pins and needles sensation in LE.\par Pt sees vascular doctor and hernia doctor. She also sees a dermatologist because medication caused itchiness adverse skin reaction.\par \par Current Medications: Lantus 30-40 u qd, Humalog 18-20 u ac, Vit D, antibiotic clarithromycin

## 2022-05-06 NOTE — ASSESSMENT
[Importance of Diet and Exercise] : importance of diet and exercise to improve glycemic control, achieve weight loss and improve cardiovascular health [Self Monitoring of Blood Glucose] : self monitoring of blood glucose [FreeTextEntry1] : 67 y/o F pt with:\par \par 1. T2DM (dx over 15 years ago) with no information on DM related complications:\par Last funduscopic exam 3/2022.\par Diabetes treatment goals discussed, including target goals for BP, LDL-c, A1c, and body weight. \par Discussed the importance of BS monitoring, along with targets for pre and post prandial BS. Briefly summarized anti-diabetic medications, including benefits and side effects along with insulin kinetics with pt. \par Emphasized the importance of preventions of DM complication along with cardiomyopathy. \par Recommend pt check BS readings (before and after meals) and continue present DM management, until the assessment of current evaluation is completed. \par Refer pt to see RD\par Will order labs today, including metabolic and lipid profiles. \par \par 2. Cushingoid facies\par Hx of uncontrolled T2DM, obesity, and HTN. This constellation of symptoms can be seen in pts with Cushing's syndrome. \par Recommend salivary cortisol, ACTH, and serum cortisol testing for initial screening. \par Pt had BMD competed in the past. She will bring report to her next appointment. \par \par Return in 6 months

## 2022-05-06 NOTE — PHYSICAL EXAM
[Alert] : alert [Normal Sclera/Conjunctiva] : normal sclera/conjunctiva [Normal Outer Ear/Nose] : the ears and nose were normal in appearance [No Respiratory Distress] : no respiratory distress [Clear to Auscultation] : lungs were clear to auscultation bilaterally [Normal S1, S2] : normal S1 and S2 [Normal Rate] : heart rate was normal [Regular Rhythm] : with a regular rhythm [Normal Bowel Sounds] : normal bowel sounds [Normal Reflexes] : deep tendon reflexes were 2+ and symmetric [Oriented x3] : oriented to person, place, and time [No Edema] : no peripheral edema [Right foot was examined, including] : right foot ~C was examined, including visual inspection with sensory and pulse exams [Left foot was examined, including] : left foot ~C was examined, including visual inspection with sensory and pulse exams [2+] : 2+ in the dorsalis pedis [Vibration Dec.] : diminished vibratory sensation at the level of the toes [de-identified] : remarkable supraclavicular fat pads, cervical fat pad [de-identified] : moon facies [de-identified] : b/l chin discoloration

## 2022-05-06 NOTE — ADDENDUM
[FreeTextEntry1] : I, Mikie Osullivan, acted solely as a scribe for Dr. Dickson Healy on this date. 05/06/2022.

## 2022-05-06 NOTE — DATA REVIEWED
[FreeTextEntry1] : Labs:\par - 3/11/22: A1c 8.9%, s.creat 0.82, ALT 13, LDL-c 135, hematocrit 37.6, Vit D 25-OH 28.3 \par - 3/07/18: A1C 11.5% 0.59 Ca 9.4  vit D 14.9 TSH 2.13

## 2022-05-06 NOTE — REVIEW OF SYSTEMS
[As Noted in HPI] : as noted in HPI [Negative] : Heme/Lymph [FreeTextEntry3] : pain near ocular prosthesis R eye [de-identified] : high BS

## 2022-05-06 NOTE — END OF VISIT
[FreeTextEntry3] : All medical record entries made by the Scribe were at my, Dr. Dickson Healy, direction and personally dictated by me on 05/06/2022. I have reviewed the chart and agree that the record accurately reflects my personal performance of the history, physical exam, assessment and plan. I have also personally directed, reviewed and agreed with the chart.  [Time Spent: ___ minutes] : I have spent [unfilled] minutes of time on the encounter.

## 2022-05-13 ENCOUNTER — APPOINTMENT (OUTPATIENT)
Dept: DERMATOLOGY | Facility: CLINIC | Age: 67
End: 2022-05-13

## 2022-05-14 LAB
ALBUMIN SERPL ELPH-MCNC: 4.1 G/DL
ALP BLD-CCNC: 89 U/L
ALT SERPL-CCNC: 15 U/L
ANION GAP SERPL CALC-SCNC: 14 MMOL/L
AST SERPL-CCNC: 16 U/L
BILIRUB SERPL-MCNC: 0.4 MG/DL
BUN SERPL-MCNC: 18 MG/DL
CALCIUM SERPL-MCNC: 9.6 MG/DL
CHLORIDE SERPL-SCNC: 105 MMOL/L
CHOLEST SERPL-MCNC: 224 MG/DL
CO2 SERPL-SCNC: 23 MMOL/L
CORTIS SERPL-MCNC: 9.5 UG/DL
CREAT SERPL-MCNC: 0.7 MG/DL
CREAT SPEC-SCNC: 146 MG/DL
EGFR: 95 ML/MIN/1.73M2
ESTIMATED AVERAGE GLUCOSE: 237 MG/DL
FOLATE SERPL-MCNC: 10.6 NG/ML
GLUCOSE BLDC GLUCOMTR-MCNC: 138
GLUCOSE SERPL-MCNC: 209 MG/DL
HBA1C MFR BLD HPLC: 9.9 %
HDLC SERPL-MCNC: 62 MG/DL
LDLC SERPL CALC-MCNC: 129 MG/DL
MICROALBUMIN 24H UR DL<=1MG/L-MCNC: 2.9 MG/DL
MICROALBUMIN/CREAT 24H UR-RTO: 20 MG/G
NONHDLC SERPL-MCNC: 162 MG/DL
POTASSIUM SERPL-SCNC: 4.6 MMOL/L
PROT SERPL-MCNC: 7 G/DL
SODIUM SERPL-SCNC: 142 MMOL/L
TRIGL SERPL-MCNC: 165 MG/DL
TSH SERPL-ACNC: 3.62 UIU/ML
VIT B12 SERPL-MCNC: 421 PG/ML

## 2022-05-16 ENCOUNTER — APPOINTMENT (OUTPATIENT)
Dept: ENDOCRINOLOGY | Facility: CLINIC | Age: 67
End: 2022-05-16
Payer: MEDICARE

## 2022-05-16 ENCOUNTER — APPOINTMENT (OUTPATIENT)
Dept: ENDOCRINOLOGY | Facility: CLINIC | Age: 67
End: 2022-05-16

## 2022-05-16 VITALS
SYSTOLIC BLOOD PRESSURE: 139 MMHG | DIASTOLIC BLOOD PRESSURE: 89 MMHG | BODY MASS INDEX: 39.92 KG/M2 | HEART RATE: 83 BPM | WEIGHT: 191 LBS

## 2022-05-16 DIAGNOSIS — R68.89 OTHER GENERAL SYMPTOMS AND SIGNS: ICD-10-CM

## 2022-05-16 PROCEDURE — 99214 OFFICE O/P EST MOD 30 MIN: CPT | Mod: 25

## 2022-05-16 PROCEDURE — 82962 GLUCOSE BLOOD TEST: CPT

## 2022-05-16 RX ORDER — INSULIN LISPRO 100 [IU]/ML
100 INJECTION, SOLUTION INTRAVENOUS; SUBCUTANEOUS
Qty: 2 | Refills: 2 | Status: ACTIVE | COMMUNITY
Start: 2022-05-16 | End: 1900-01-01

## 2022-05-16 RX ORDER — INSULIN GLARGINE 100 [IU]/ML
100 INJECTION, SOLUTION SUBCUTANEOUS
Qty: 2 | Refills: 0 | Status: ACTIVE | COMMUNITY
Start: 2022-05-16 | End: 1900-01-01

## 2022-05-17 PROBLEM — R68.89 CUSHINGOID FACIES: Status: ACTIVE | Noted: 2022-05-06

## 2022-05-17 NOTE — ADDENDUM
[FreeTextEntry1] : I Samueldoris Wild act soley as a scribe for Dr. Dickson Healy on this date. 05/16/2022

## 2022-05-17 NOTE — THERAPY
[Today's Date] : [unfilled] [Lantus] : Lantus [Humalog] : Humalog [FreeTextEntry9] : 45 u  [de-identified] : 18-20 u ac [FreeTextEntry7] : Atorvastatin 10 mg qd

## 2022-05-17 NOTE — END OF VISIT
[FreeTextEntry3] : All medical record entries made by the Scribe were at my, Dr. Dickson Healy, direction and personally dictated by me on 05/16/2022. I have reviewed the chart and agree that the record accurately reflects my personal performance of the history, physical exam, assessment and plan. I have also personally directed, reviewed and agreed with the chart.  [Time Spent: ___ minutes] : I have spent [unfilled] minutes of time on the encounter.

## 2022-05-17 NOTE — HISTORY OF PRESENT ILLNESS
[FreeTextEntry1] : 65 y/o female pt, with /92, BMI 39.71, and POCT 138 with Hx of T2DM (dx approx. 20 years ago), with no known DM related complications, presents today for endocrine evaluation. \par Pt has Hx of multiple abdominal surgical repairs. Hx of chronic sinus infection. \par Other PMHx: L hand carpal tunnel syndrome (dx 10 years ago, took steroids for "short period of time"), abdominal hernia, vitiligo \par PSHx: hysterectomy, R leg varicose vein surgery (5 years ago, "18 veins removed from R leg")\par FHx: DM (mother, father, 1 brother)\par No FHx of premature heart disease. Pt has 1 living sibling.\par SHx:non-smoker, no etoh use \par Last funduscopic visit: 3/2022. 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 Pt was informed she does not have diabetic retinopathy. \par Last dental visit: 2020 \par Allergies: Penicillin \par No children. Pt does not remember age of menopause. \par Pt has received 3 doses of COVID vaccine. \par \par 05/06/2022\par Pt presents today feeling well with c/o elevation in BS for the past couple weeks (BS in the 300-400s) and states her insulin injection pen was not turning enough and giving her the full dosage.She has been taking Lantus for 3-4 years. Pt checks BS 2-3 times per day, when she wakes up and before she goes to bed at night. She has never been hospitalized due to DM complications.\par Pt states that since the COVID19 pandemic began, she stopped working. She used to walk 3 miles qd and weight was 157 lb. She gained 36 lb over the pandemic, does not drink as much water as before, becomes tired quickly and has SOB when walking. She goes to bed at 4am and has difficulty falling asleep. Her internist advised her to take sleep medication but pt would prefer to not take medication. Pt admits she does not take her other prescribed medication at a consistent time every day. \par Pt also notes pain near ocular prosthesis R eye and states her waist hurts but attributes it abdominal hernia. \par Denies blurred vision, weight loss, pins and needles sensation in LE.\par Pt sees vascular doctor and hernia doctor. She also sees a dermatologist because medication caused itchiness adverse skin reaction.\par \par 05/16/2022\par Pt has POCT 236, /89 and BMI 39.92. She gained 2 lb in 1 month. She brought a physical copy of blood test. \par Today, pt is feeling well, but continues to c/o pain near ocular prosthesis R eye. She is currently on Lantus 35 u and Humalog 18-20 u ac for DM. \par \par Current Medications: Lantus 45 u qd (increased from 35 u), Humalog 18-20 u ac, Atorvastatin 10 mg qd (prescribed this visit 05/16/22), Baby ASA 81 mg (prescribed this visit 05/16/22), Vit D, antibiotic clarithromycin

## 2022-05-17 NOTE — PHYSICAL EXAM
[Alert] : alert [Normal Sclera/Conjunctiva] : normal sclera/conjunctiva [Normal Outer Ear/Nose] : the ears and nose were normal in appearance [No Respiratory Distress] : no respiratory distress [Clear to Auscultation] : lungs were clear to auscultation bilaterally [Normal S1, S2] : normal S1 and S2 [Normal Rate] : heart rate was normal [Regular Rhythm] : with a regular rhythm [No Edema] : no peripheral edema [Normal Bowel Sounds] : normal bowel sounds [Right foot was examined, including] : right foot ~C was examined, including visual inspection with sensory and pulse exams [Left foot was examined, including] : left foot ~C was examined, including visual inspection with sensory and pulse exams [2+] : 2+ in the dorsalis pedis [Vibration Dec.] : diminished vibratory sensation at the level of the toes [Normal Reflexes] : deep tendon reflexes were 2+ and symmetric [Oriented x3] : oriented to person, place, and time [Spine Straight] : spine straight [No Tremors] : no tremors [de-identified] : remarkable supraclavicular fat pads, cervical fat pad [de-identified] : moon facies [de-identified] : Varicose veins. b/l chin discoloration

## 2022-05-17 NOTE — ASSESSMENT
[FreeTextEntry1] : 67 y/o F pt with:\par \par 1. T2DM diagnosed ~ 20 years ago (said ~10 yrs prior to this visit)\par Pt has no known DM-related complications. \par She had surgery for varicose veins 5 years ago. \par Her BS continues to be in the 200s, with recent A1c of 9.9%. \par Pt was recommended to limit caloric intake. \par We are modifying her MDI:  (refer to 'DM Therapy Regimen' for details). \par Will strongly consider GLP-1 and SGLT -2 for her next visit. \par \par 2. Hypercholesterolemia\par Pt is at increased risk for ASCVD.\par In addition to reducing weight and reaching BP of 130/90, we need to lower LDL-c around 80. \par Therefore, start Atorvastatin 10 mg qd and Baby ASA 81 mg qd. \par We are considering cardiologist evaluation. \par \par 3. Cushingoid facies and subclavicular fat pads\par Awaiting for salivary cortisol results.\par \par Return in 3 months [Carbohydrate Consistent Diet] : carbohydrate consistent diet [Importance of Diet and Exercise] : importance of diet and exercise to improve glycemic control, achieve weight loss and improve cardiovascular health [Self Monitoring of Blood Glucose] : self monitoring of blood glucose [Weight Loss] : weight loss

## 2022-05-17 NOTE — DATA REVIEWED
[FreeTextEntry1] : Labs:\par - 05/12/22: A1c 9.9% (H), s.creat 0.70, Glucose 209 (H), ACR ratio 20, Cortisol AM 9.5, LDL-c 129 (H),  (H), cholesterol 224 (H), TSH 3.62\par - 3/11/22: A1c 8.9%, s.creat 0.82, ALT 13, LDL-c 135, hematocrit 37.6, Vit D 25-OH 28.3 \par - 3/07/18: A1C 11.5% 0.59 Ca 9.4  vit D 14.9 TSH 2.13, urine no protein

## 2022-05-17 NOTE — REVIEW OF SYSTEMS
[Recent Weight Gain (___ Lbs)] : recent weight gain: [unfilled] lbs [As Noted in HPI] : as noted in HPI [Negative] : Musculoskeletal [FreeTextEntry2] : She gained 2 lbs in 1 month.  [FreeTextEntry3] : pain near ocular prosthesis R eye.  [de-identified] : Hyperglycemias.

## 2022-05-18 ENCOUNTER — RX RENEWAL (OUTPATIENT)
Age: 67
End: 2022-05-18

## 2022-06-05 LAB
ACTH-ESO: 8.6 PG/ML
CORTIS SAL-MCNC: NORMAL
GLUCOSE BLDC GLUCOMTR-MCNC: 236

## 2022-06-29 ENCOUNTER — APPOINTMENT (OUTPATIENT)
Dept: OTOLARYNGOLOGY | Facility: CLINIC | Age: 67
End: 2022-06-29
Payer: MEDICARE

## 2022-06-29 VITALS
WEIGHT: 191 LBS | HEART RATE: 67 BPM | BODY MASS INDEX: 40.09 KG/M2 | SYSTOLIC BLOOD PRESSURE: 127 MMHG | HEIGHT: 58 IN | DIASTOLIC BLOOD PRESSURE: 75 MMHG | TEMPERATURE: 96.7 F

## 2022-06-29 DIAGNOSIS — R43.8 OTHER DISTURBANCES OF SMELL AND TASTE: ICD-10-CM

## 2022-06-29 DIAGNOSIS — R43.0 ANOSMIA: ICD-10-CM

## 2022-06-29 DIAGNOSIS — D17.0 BENIGN LIPOMATOUS NEOPLASM OF SKIN AND SUBCUTANEOUS TISSUE OF HEAD, FACE AND NECK: ICD-10-CM

## 2022-06-29 DIAGNOSIS — J32.8 OTHER CHRONIC SINUSITIS: ICD-10-CM

## 2022-06-29 PROCEDURE — 99213 OFFICE O/P EST LOW 20 MIN: CPT

## 2022-06-29 RX ORDER — TRIAMCINOLONE ACETONIDE 1 MG/G
0.1 OINTMENT TOPICAL TWICE DAILY
Qty: 454 | Refills: 2 | Status: COMPLETED | COMMUNITY
Start: 2022-03-11 | End: 2022-06-29

## 2022-06-29 RX ORDER — IBUPROFEN 600 MG/1
600 TABLET, FILM COATED ORAL
Qty: 30 | Refills: 0 | Status: ACTIVE | COMMUNITY
Start: 2022-05-29

## 2022-06-29 RX ORDER — LINAGLIPTIN 5 MG/1
5 TABLET, FILM COATED ORAL
Refills: 0 | Status: COMPLETED | COMMUNITY
End: 2022-06-29

## 2022-06-29 RX ORDER — TIZANIDINE 4 MG/1
4 TABLET ORAL
Qty: 14 | Refills: 0 | Status: COMPLETED | COMMUNITY
Start: 2022-05-29

## 2022-06-29 RX ORDER — CLARITHROMYCIN 250 MG/1
250 TABLET, FILM COATED ORAL
Qty: 42 | Refills: 0 | Status: COMPLETED | COMMUNITY
Start: 2022-04-27 | End: 2022-06-29

## 2022-06-29 RX ORDER — VITAMIN K2 90 MCG
125 MCG CAPSULE ORAL
Refills: 0 | Status: ACTIVE | COMMUNITY

## 2022-06-29 RX ORDER — COVID-19 MOLECULAR TEST ASSAY
KIT MISCELLANEOUS
Qty: 8 | Refills: 0 | Status: COMPLETED | COMMUNITY
Start: 2022-05-27

## 2022-06-29 RX ORDER — METFORMIN HYDROCHLORIDE 1000 MG/1
1000 TABLET, COATED ORAL
Refills: 0 | Status: DISCONTINUED | COMMUNITY
End: 2022-04-27

## 2022-06-29 RX ORDER — BLOOD SUGAR DIAGNOSTIC
STRIP MISCELLANEOUS
Qty: 300 | Refills: 0 | Status: COMPLETED | COMMUNITY
Start: 2021-12-13

## 2022-06-29 RX ORDER — CHLORHEXIDINE GLUCONATE 4 %
LIQUID (ML) TOPICAL
Refills: 0 | Status: ACTIVE | COMMUNITY

## 2022-06-29 RX ORDER — BIOTIN 10 MG
140-100 TABLET ORAL
Refills: 0 | Status: ACTIVE | COMMUNITY

## 2022-06-29 NOTE — PHYSICAL EXAM
[Nasal Endoscopy Performed] : nasal endoscopy was performed, see procedure section for findings [] : septum deviated bilaterally [Midline] : trachea located in midline position [Normal] : no rashes [FreeTextEntry1] : No hoarseness  [de-identified] : Left supraclavicular swelling/mass 6 cm, nontender, doughy, suggestive of lipoma. [de-identified] : Carotid pulses 2+ bilateral.

## 2022-06-29 NOTE — CONSULT LETTER
[Dear  ___] : Dear  [unfilled], [Courtesy Letter:] : I had the pleasure of seeing your patient, [unfilled], in my office today. [Please see my note below.] : Please see my note below. [Sincerely,] : Sincerely, [DrSonia  ___] : Dr. KIM [FreeTextEntry2] : Joes Juan Dash MD\par [address]\[place] [address] [FreeTextEntry3] : \par Sandra Avila MD \par Otolaryngology, Head and Neck Surgery \par \par

## 2022-06-29 NOTE — ASSESSMENT
[FreeTextEntry1] : Ms. BARROW  is a 65 yo woman who was evaluated for the following issues today:\par \par 1.)  Loss of smell and taste since 2017, without preceding illness or trauma.\par She can smell and taste some things if the stimuli are very strong.  She was unable to smell a gas leak, so there is a safety issue.\par No obstruction of the olfactory cleft is noted on exam today.\par --> smell training\par --> make sure smoke alarms are functional in her home\par \par 2.)  Deviated nasal septum and chronic sinusitis\par --> antibiotics x 3 weeks\par --> mometasone nasal spray\par --> CT sinus noncontrast\par \par 3.) Left supraclavicular neck mass is 6 cm and suggestive of a lipoma\par --> CT neck with contrast\par \par Return in 2 month

## 2022-06-29 NOTE — HISTORY OF PRESENT ILLNESS
[de-identified] : Ms. BARROW is a 66 year old woman who was referred by Dr. 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 She can taste sweet, spicy and salty but cannot taste most food. She can smell a very small amount. She smells [place] after it is on for a while. She could smell a gas leak.\par No preceding illness or head trauma prior to the change in smell/taste. No phantom smells. \par She has nasal congestion and feels swelling in her nose, which improvement a little with steam inhalation.\par She tried saline and fluticasone spray with a small improvement of congestion but no change in smell.\par No runny nose or postnasal drip.\par \par Painless mass/swelling over left clavicle that fluctuates in size.   \par She had an US neck in 2020/2021.\par \par History of vertigo with first episode in October 2021;, resolved January 2022.\par No vertigo since then.  No other otologic sx.\par \par \par ALLERGIES\par PCN (rash, itch, swelling)\par \par MEDICATIONS\par Lantus\par Humalog\par Triamcinolone 0.1% ointment PRN\par Vit D3 5K BID\par Cranberry Plus Vit 2\par Multivitamin\par

## 2022-06-29 NOTE — PROCEDURE
[FreeTextEntry6] : \par Indication:  chronic sinusitis\par -Verbal consent was obtained from patient prior to exam. \par Nasal endoscopy was performed with flexible  scope.\par Findings: \par -- Inferior turbinates mildly edematous bilateral.  Inferior meatus clear bilateral.\par -- Septum was  S-shaped.\par -- No polyps either side nose\par -- Mucus clear bilateral\par -- Middle and superior turbinates normal bilateral\par -- Middle meatus congested R>L.  SER clear bilateral.\par -- Nasopharynx without mass or exudate\par -- Adenoids were small\par -- Eustachian orifices were clear bilateral\par \par The patient tolerated the procedure well.\par

## 2022-06-29 NOTE — REVIEW OF SYSTEMS
[Patient Intake Form Reviewed] : Patient intake form was reviewed [As Noted in HPI] : as noted in HPI [Itching] : itching [Negative] : Heme/Lymph [FreeTextEntry3] : right eye prosthesis [de-identified] : thinning hair

## 2022-06-30 PROBLEM — J32.8 OTHER CHRONIC SINUSITIS: Status: ACTIVE | Noted: 2022-04-27

## 2022-07-01 ENCOUNTER — APPOINTMENT (OUTPATIENT)
Dept: SURGERY | Facility: CLINIC | Age: 67
End: 2022-07-01

## 2022-07-01 VITALS
BODY MASS INDEX: 40.72 KG/M2 | WEIGHT: 194 LBS | HEART RATE: 74 BPM | TEMPERATURE: 97.1 F | HEIGHT: 58 IN | DIASTOLIC BLOOD PRESSURE: 72 MMHG | OXYGEN SATURATION: 97 % | SYSTOLIC BLOOD PRESSURE: 126 MMHG

## 2022-07-01 PROCEDURE — 99213 OFFICE O/P EST LOW 20 MIN: CPT

## 2022-07-05 ENCOUNTER — APPOINTMENT (OUTPATIENT)
Dept: ENDOCRINOLOGY | Facility: CLINIC | Age: 67
End: 2022-07-05

## 2022-07-05 VITALS
BODY MASS INDEX: 40.09 KG/M2 | WEIGHT: 191 LBS | HEIGHT: 58 IN | SYSTOLIC BLOOD PRESSURE: 154 MMHG | DIASTOLIC BLOOD PRESSURE: 93 MMHG | HEART RATE: 99 BPM

## 2022-07-05 DIAGNOSIS — D17.0 BENIGN LIPOMATOUS NEOPLASM OF SKIN AND SUBCUTANEOUS TISSUE OF HEAD, FACE AND NECK: ICD-10-CM

## 2022-07-05 PROCEDURE — 82962 GLUCOSE BLOOD TEST: CPT

## 2022-07-05 PROCEDURE — 97802 MEDICAL NUTRITION INDIV IN: CPT

## 2022-07-05 PROCEDURE — 99215 OFFICE O/P EST HI 40 MIN: CPT | Mod: 25

## 2022-07-06 ENCOUNTER — NON-APPOINTMENT (OUTPATIENT)
Age: 67
End: 2022-07-06

## 2022-07-06 PROBLEM — D17.0 LIPOMA OF NECK: Status: ACTIVE | Noted: 2022-04-27

## 2022-07-06 NOTE — PHYSICAL EXAM
[Alert] : alert [Normal Sclera/Conjunctiva] : normal sclera/conjunctiva [Normal Outer Ear/Nose] : the ears and nose were normal in appearance [No Respiratory Distress] : no respiratory distress [Clear to Auscultation] : lungs were clear to auscultation bilaterally [Normal S1, S2] : normal S1 and S2 [Normal Rate] : heart rate was normal [Regular Rhythm] : with a regular rhythm [No Edema] : no peripheral edema [Normal Bowel Sounds] : normal bowel sounds [Spine Straight] : spine straight [Right foot was examined, including] : right foot ~C was examined, including visual inspection with sensory and pulse exams [Left foot was examined, including] : left foot ~C was examined, including visual inspection with sensory and pulse exams [2+] : 2+ in the dorsalis pedis [Vibration Dec.] : diminished vibratory sensation at the level of the toes [Normal Reflexes] : deep tendon reflexes were 2+ and symmetric [No Tremors] : no tremors [Oriented x3] : oriented to person, place, and time [Well Nourished] : well nourished [No Acute Distress] : no acute distress [Well Developed] : well developed [EOMI] : extra ocular movement intact [No Proptosis] : no proptosis [Normal Oropharynx] : the oropharynx was normal [Thyroid Not Enlarged] : the thyroid was not enlarged [No Thyroid Nodules] : no palpable thyroid nodules [No Accessory Muscle Use] : no accessory muscle use [Pedal Pulses Normal] : the pedal pulses are present [Not Tender] : non-tender [Not Distended] : not distended [Soft] : abdomen soft [Normal Anterior Cervical Nodes] : no anterior cervical lymphadenopathy [No Spinal Tenderness] : no spinal tenderness [No Stigmata of Cushings Syndrome] : no stigmata of Cushings Syndrome [Normal Gait] : normal gait [Normal Strength/Tone] : muscle strength and tone were normal [No Rash] : no rash [Acanthosis Nigricans] : no acanthosis nigricans [de-identified] : remarkable supraclavicular fat pads, cervical fat pad [de-identified] : moon facies [de-identified] : Varicose veins. b/l chin discoloration

## 2022-07-06 NOTE — END OF VISIT
[FreeTextEntry3] : All medical record entries made by the Scribe were at my, Dr. Dickson Healy, direction and personally dictated by me on 07/05/2022. I have reviewed the chart and agree that the record accurately reflects my personal performance of the history, physical exam, assessment and plan. I have also personally, directed, reviewed and agreed with the chart  [Time Spent: ___ minutes] : I have spent [unfilled] minutes of time on the encounter.

## 2022-07-06 NOTE — ADDENDUM
[FreeTextEntry1] : I, Megan Cope, acted solely as a scribe for Dr. Dickson Helay on this date 07/05/2022

## 2022-07-06 NOTE — ASSESSMENT
[Carbohydrate Consistent Diet] : carbohydrate consistent diet [Importance of Diet and Exercise] : importance of diet and exercise to improve glycemic control, achieve weight loss and improve cardiovascular health [Self Monitoring of Blood Glucose] : self monitoring of blood glucose [Weight Loss] : weight loss [FreeTextEntry1] : 65 y/o F pt with:\par \par 1. T2DM diagnosed ~ 20 years ago: with peripheral neuropathy: \par Pt has no known history of CAD. \par Her DM continues to be is poorly controlled. She sporadically checks her BS at home, and continues to use DM regiment prescribed by her PCP.\par Before breakfast, her FBS was 121. After taking 16 u of Humalog, her POCT was 254 (after 3 hours). \par Pt was explained that she is at risk for developing cardiac event and stroke, therefore she needs to continue effort to improve her DM.\par We provide updated MDI regiment that include 45 u of Lantus and 18 u of Humalog plus and additional sliding scale for glucose readings of more than 100 (refer to 'DM Therapy Regimen' for details). This was explained, and a copy was given to pt. \par We are initiating farxiga 5 mg (benefits and side effects explained).\par We will initiate ozempic weekly injections after pt has seen RD and exercise . \par Refer pt to see RD and exercise . \par \par 2. Obesity with BMI 39.9. Class II:\par Continue for assessing comorbidities associated with obesity. \par Pt referred to RD and exercise . We are initiating farxiga 5 mg to pt.\par She will meet with NP to initiate GLP-1  \par \par 3. Subclavicular and cervical fat pads:  \par A CAT scan from 4/27/22 confirmed asymmetric fullness of the fatty tissue in the left supraclavicular fossa.  \par Lab results show salivary cortisol is less than 50, not related to Cushing's disease \par \par \par Return in 4 months [Other____] : Risks and benefits of [unfilled] was discussed with the patient.

## 2022-07-06 NOTE — PHYSICAL EXAM
[Normal Breath Sounds] : Normal breath sounds [Normal Heart Sounds] : normal heart sounds [Alert] : alert [Oriented to Person] : oriented to person [Oriented to Place] : oriented to place [Oriented to Time] : oriented to time [Calm] : calm [JVD] : no jugular venous distention  [Abdominal Masses] : No abdominal masses [Abdomen Tenderness] : ~T ~M No abdominal tenderness [Tender] : was nontender [Enlarged] : not enlarged [de-identified] : DANG. Bonifacio. Appropriate. Comfortable. [de-identified] : Pupil reactive. She has a prosthetic globe. No Scleral Icterus. NCAT. [de-identified] : Supple. Trachea midline. No overt lymphadenopathy. No JVD [de-identified] : Soft, non tender and non distended. 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 No overt cellulitis. Massive incisional hernia with poor skin quality and pannus.
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
## 2022-07-06 NOTE — THERAPY
[Today's Date] : [unfilled] [Lantus] : Lantus [Humalog] : Humalog [FreeTextEntry9] : 45 u  [de-identified] : 18-20 u ac + ss:                                                                                                                                                                                       For glucose readings below 100: No additional insulin.                                                                                                                            For glucose between 101-150: + 2 u. 151-200: + 3 u. 201-250: + 5 u. Above 250: + 7 u.  [FreeTextEntry7] : Atorvastatin 10 mg qd

## 2022-07-06 NOTE — DATA REVIEWED
[FreeTextEntry1] : Labs:\par - 05/12/22: Salivary Cortisol: Midnight cortisol < 50, A1c 9.9% (H), s.creat 0.70, Glucose 209 (H), ACR ratio 20, Cortisol AM 9.5, LDL-c 129 (H),  (H), cholesterol 224 (H), TSH 3.62\par - 3/11/22: A1c 8.9%, s.creat 0.82, ALT 13, LDL-c 135, hematocrit 37.6, Vit D 25-OH 28.3 \par - 3/07/18: A1C 11.5% 0.59 Ca 9.4  vit D 14.9 TSH 2.13, urine no protein

## 2022-07-06 NOTE — ASSESSMENT
[FreeTextEntry1] : Case discussed/pt seen with attending, . 66 year old female with complicated recurrent incisional hernia with diabetes, morbid obesity and unfavorable skin configuration. She has established care with endocrinology and she is working on glucose control. We discussed recommendation of discussing possibility of ozempic with endocrinologist to aid with weight loss. She is seeing endocrinology on 7/5. We discussed in detail again that at this time she is a poor candidate for surgery d/t her being at very high risk for complications at current weight. She expressed understanding. She has requested referral to establish PCP and gynecology. Referrals provided. She will RTC in 6 months for check up, sooner if needed. All questions answered. \par

## 2022-07-07 ENCOUNTER — NON-APPOINTMENT (OUTPATIENT)
Age: 67
End: 2022-07-07

## 2022-07-07 LAB — GLUCOSE BLDC GLUCOMTR-MCNC: 254

## 2022-07-25 ENCOUNTER — APPOINTMENT (OUTPATIENT)
Dept: HEART AND VASCULAR | Facility: CLINIC | Age: 67
End: 2022-07-25

## 2022-07-25 VITALS
OXYGEN SATURATION: 97 % | HEART RATE: 101 BPM | SYSTOLIC BLOOD PRESSURE: 148 MMHG | DIASTOLIC BLOOD PRESSURE: 84 MMHG | WEIGHT: 194 LBS | HEIGHT: 58 IN | TEMPERATURE: 97.6 F | BODY MASS INDEX: 40.72 KG/M2

## 2022-07-25 DIAGNOSIS — R06.02 SHORTNESS OF BREATH: ICD-10-CM

## 2022-07-25 PROCEDURE — 93000 ELECTROCARDIOGRAM COMPLETE: CPT

## 2022-07-25 PROCEDURE — 99204 OFFICE O/P NEW MOD 45 MIN: CPT

## 2022-07-25 PROCEDURE — 93306 TTE W/DOPPLER COMPLETE: CPT

## 2022-07-25 NOTE — PHYSICAL EXAM
[Well Developed] : well developed [Well Nourished] : well nourished [No Acute Distress] : no acute distress [Normal Conjunctiva] : normal conjunctiva [Normal Venous Pressure] : normal venous pressure [No Carotid Bruit] : no carotid bruit [Normal S1, S2] : normal S1, S2 [No Murmur] : no murmur [No Rub] : no rub [No Gallop] : no gallop [Clear Lung Fields] : clear lung fields [Good Air Entry] : good air entry [No Respiratory Distress] : no respiratory distress  [Gait - Sufficient for Exercise Testing] : gait - sufficient for exercise testing [No Edema] : no edema [No Cyanosis] : no cyanosis [No Clubbing] : no clubbing [Moves all extremities] : moves all extremities [No Focal Deficits] : no focal deficits [Normal Speech] : normal speech [Alert and Oriented] : alert and oriented [Normal memory] : normal memory

## 2022-07-25 NOTE — DISCUSSION/SUMMARY
[FreeTextEntry1] : chest pain sob- echo results discussed, normal LVEF, no significant valve disease\par for CTA coronaries\par results and further testing disussed [EKG obtained to assist in diagnosis and management of assessed problem(s)] : EKG obtained to assist in diagnosis and management of assessed problem(s)

## 2022-07-29 ENCOUNTER — APPOINTMENT (OUTPATIENT)
Dept: OTOLARYNGOLOGY | Facility: CLINIC | Age: 67
End: 2022-07-29

## 2022-08-03 ENCOUNTER — APPOINTMENT (OUTPATIENT)
Dept: VASCULAR SURGERY | Facility: CLINIC | Age: 67
End: 2022-08-03

## 2022-08-03 ENCOUNTER — RESULT REVIEW (OUTPATIENT)
Age: 67
End: 2022-08-03

## 2022-08-03 ENCOUNTER — APPOINTMENT (OUTPATIENT)
Dept: CT IMAGING | Facility: CLINIC | Age: 67
End: 2022-08-03

## 2022-08-03 ENCOUNTER — OUTPATIENT (OUTPATIENT)
Dept: OUTPATIENT SERVICES | Facility: HOSPITAL | Age: 67
LOS: 1 days | End: 2022-08-03

## 2022-08-03 DIAGNOSIS — I83.813 VARICOSE VEINS OF BILATERAL LOWER EXTREMITIES WITH PAIN: ICD-10-CM

## 2022-08-03 DIAGNOSIS — I83.893 VARICOSE VEINS OF BILATERAL LOWER EXTREMITIES WITH OTHER COMPLICATIONS: ICD-10-CM

## 2022-08-03 DIAGNOSIS — L30.9 DERMATITIS, UNSPECIFIED: ICD-10-CM

## 2022-08-03 PROCEDURE — 93970 EXTREMITY STUDY: CPT

## 2022-08-03 PROCEDURE — 99213 OFFICE O/P EST LOW 20 MIN: CPT | Mod: 25

## 2022-08-03 PROCEDURE — 75574 CT ANGIO HRT W/3D IMAGE: CPT | Mod: 26,MH

## 2022-08-03 NOTE — PROCEDURE
[FreeTextEntry1] : Vascular surgeon discussed with the patient:\par Varicose veins are enlarged, twisted veins. Varicose veins are caused by increased blood pressure in the veins.  The blood moves towards the heart by 1-way valves in the veins. When the valves become weakened or damaged, blood can collect in the veins. This causes the veins to become enlarged. Sitting or standing for long periods can cause blood to pool in the leg veins, increasing the pressure within the veins. The veins can stretch from the increased pressure. This may weaken the walls of the veins and damage the valves.\par Varicose veins may be more common in some families (inherited).  Factors that may increase pressure include:  obesity, older age, being female, pregnancy, taking oral contraceptive pills or hormone replacement, being inactive, and/or smoking. \par The most common symptoms of varicose veins are sensations in the legs, such as a heavy feeling, burning, and/or aching. However, each individual may experience symptoms differently.  Other symptoms may include:  color changes in the skin, sores on the legs, or rash.  Severe varicose veins may eventually produce long-term mild swelling that can result in more serious skin and tissue problems, such as ulcers and non-healing sores.\par Varicose veins are diagnosed by a complete medical history, physical examination, and diagnostic studies for varicose veins including duplex ultrasound and color-flow imaging.  \par Medical treatment for varicose veins include:  compression stockings, sclerotherapy, RFA, endovenous laser ablation and/or surgical treatment microphlebectomy.  \par \par \par

## 2022-08-03 NOTE — PHYSICAL EXAM
[JVD] : no jugular venous distention  [2+] : left 2+ [Ankle Swelling (On Exam)] : present [Ankle Swelling On The Left] : moderate [Varicose Veins Of Lower Extremities] : bilaterally [] : bilaterally [Ankle Swelling Bilaterally] : severe [Purpura] : no purpura  [Petechiae] : no petechiae [Skin Ulcer] : no ulcer [Skin Induration] : induration [Alert] : alert [Oriented to Person] : oriented to person [Oriented to Place] : oriented to place [Oriented to Time] : oriented to time [Calm] : calm [de-identified] : wdwn nad [de-identified] : wnl [FreeTextEntry1] : diffuse large varicose veins b.l legs, severe venous dermatitis especially left leg [de-identified] : wnl [de-identified] : as above

## 2022-08-05 ENCOUNTER — APPOINTMENT (OUTPATIENT)
Dept: ENDOCRINOLOGY | Facility: CLINIC | Age: 67
End: 2022-08-05

## 2022-08-11 ENCOUNTER — RESULT REVIEW (OUTPATIENT)
Age: 67
End: 2022-08-11

## 2022-08-11 ENCOUNTER — OUTPATIENT (OUTPATIENT)
Dept: OUTPATIENT SERVICES | Facility: HOSPITAL | Age: 67
LOS: 1 days | End: 2022-08-11

## 2022-08-11 PROCEDURE — 0504T: CPT

## 2022-08-12 ENCOUNTER — APPOINTMENT (OUTPATIENT)
Dept: DERMATOLOGY | Facility: CLINIC | Age: 67
End: 2022-08-12

## 2022-08-12 PROCEDURE — 99214 OFFICE O/P EST MOD 30 MIN: CPT

## 2022-08-12 RX ORDER — MOMETASONE FUROATE 1 MG/G
0.1 OINTMENT TOPICAL TWICE DAILY
Qty: 1 | Refills: 3 | Status: ACTIVE | COMMUNITY
Start: 2022-08-12 | End: 1900-01-01

## 2022-08-12 NOTE — PHYSICAL EXAM
[FreeTextEntry3] : depigmented patches neck/shoulder, wrist, chest, abdomen\par \par also lower legs\par \par erythematous plaques legs

## 2022-08-12 NOTE — ASSESSMENT
[FreeTextEntry1] : dermatitis\par on legs\par stasis\par ?component of AD, seems to be itchy elsewhere\par may consider Dupixent if all else fails\par \par vitiligo\par unclear if on legs d/t vitiligo or rubbing, could be Koebnerization of vitiligo\par \par mometasone ointment prn itch for now

## 2022-08-12 NOTE — HISTORY OF PRESENT ILLNESS
[FreeTextEntry1] : rash [de-identified] : rash\par LV thought to be severe dermatitis\par d/t severe venous stasis\par pt says being w/u for procedure on veins\par \par pt also states hx of vitiligo

## 2022-08-17 ENCOUNTER — APPOINTMENT (OUTPATIENT)
Dept: ENDOCRINOLOGY | Facility: CLINIC | Age: 67
End: 2022-08-17

## 2022-08-22 ENCOUNTER — APPOINTMENT (OUTPATIENT)
Dept: ENDOCRINOLOGY | Facility: CLINIC | Age: 67
End: 2022-08-22

## 2022-08-22 VITALS
SYSTOLIC BLOOD PRESSURE: 137 MMHG | DIASTOLIC BLOOD PRESSURE: 82 MMHG | BODY MASS INDEX: 40.34 KG/M2 | WEIGHT: 193 LBS | HEART RATE: 84 BPM

## 2022-08-22 LAB
GLUCOSE BLDC GLUCOMTR-MCNC: 240
HBA1C MFR BLD HPLC: 7.6

## 2022-08-22 PROCEDURE — 99213 OFFICE O/P EST LOW 20 MIN: CPT | Mod: 25

## 2022-08-22 PROCEDURE — 83036 HEMOGLOBIN GLYCOSYLATED A1C: CPT | Mod: QW

## 2022-08-22 PROCEDURE — 82962 GLUCOSE BLOOD TEST: CPT

## 2022-08-22 RX ORDER — SEMAGLUTIDE 1.34 MG/ML
2 INJECTION, SOLUTION SUBCUTANEOUS
Qty: 1 | Refills: 0 | Status: COMPLETED | OUTPATIENT
Start: 2022-08-22 | End: 2022-08-22

## 2022-08-22 NOTE — CONSULT LETTER
[Courtesy Letter:] : I had the pleasure of seeing your patient, [unfilled], in my office today. [Please see my note below.] : Please see my note below. [Consult Closing:] : Thank you very much for allowing me to participate in the care of this patient.  If you have any questions, please do not hesitate to contact me. [Sincerely,] : Sincerely, [FreeTextEntry3] : Lili Saldaña NP

## 2022-08-22 NOTE — ASSESSMENT
[Long Term Vascular Complications] : long term vascular complications of diabetes [Carbohydrate Consistent Diet] : carbohydrate consistent diet [Importance of Diet and Exercise] : importance of diet and exercise to improve glycemic control, achieve weight loss and improve cardiovascular health [Self Monitoring of Blood Glucose] : self monitoring of blood glucose [Injection Technique, Storage, Sharps Disposal] : injection technique, storage, and sharps disposal [Retinopathy Screening] : Patient was referred to ophthalmology for retinopathy screening [Weight Loss] : weight loss [FreeTextEntry1] : 1. T2DM diagnosed ~ 20 years ago: with peripheral neuropathy: \par Pt has no known history of CAD. \par Congratulated on A1c improvement to 7.6%.\par Continue current insulin doses. Advised she might have to decrease doses by 4-6 units with initiation of farxiga and ozempic.\par Continue farxiga 5 mg daily.\par Start Ozempic 0.25mg weekly injections, and titrate up as tolerated.\par Insurance doesn't cover >4 strips/day. She can check 3 times daily at various times\par Refer pt to see RD and exercise . \par \par 2. Obesity with BMI 39.9. Class II:\par Continue for assessing comorbidities associated with obesity. \par Pt referred to RD and exercise . \par Starting Ozempic.\par \par 3. Subclavicular and cervical fat pads: \par A CAT scan from 4/27/22 confirmed asymmetric fullness of the fatty tissue in the left supraclavicular fossa. \par Lab results show salivary cortisol is less than 50, not related to Cushing's disease \par She wants to know if she needs repeat salivary testing. Dr Healy will discuss with her further at next appt.\par \par RTO Dr Healy 11/8.\par \par Education:\par OZEMPIC may cause serious side effects, including:  Possible thyroid tumors, including cancer  \par The most common side effects of OZEMPIC may include nausea, vomiting, diarrhea, stomach (abdominal) pain and constipation. 	\par  Less common, but more serious side effects: inflammation of your pancreas (pancreatitis). Stop using OZEMPIC and call your healthcare provider right away if you have severe pain in your stomach area (abdomen) that will not go away, with or without vomiting. You may feel the pain from your abdomen to your back. \par Tell your healthcare provider if you have changes in vision, low blood sugar (hypoglycemia). \par \par Medication administration:\par Start by checking your pen to make sure that it contains Ozempic, then look at the pictures below to get to know the different parts of your pen and needle. Your pen is a prefilled dial-a-dose pen. It contains 2 mg of semaglutide, and you can select doses of 0.25 mg or 0.5 mg. Your pen is made to be used with NovoFine® Plus or NovoFine® disposable needles up to a length of 8 mm. NovoFine® Plus 32G 4 mm disposable needles are enclosed. Always use a new needle for each injection. 	\par Step 1:\par  --Wash your hands with soap and water. Check the name and colored label of your pen, to make sure that it contains Ozempic. This is especially important if you take more than 1 type of medicine. Pull off the pen cap. Check that Ozempic in your pen is clear and colorless.   	\par  --Take a new needle, and tear off the paper tab.  	\par  Push the needle straight onto the pen. Turn until it is on tight.  	\par  Pull off the outer needle cap. Do not throw it away.  \par Step 2:	\par --Check the Ozempic flow with each new pen 	\par Turn the dose selector until the dose counter shows the flow check symbol (..-).  Hold the pen with the needle pointing up.   Press and hold in the dose button until the dose counter shows 0. The 0 must line up with the dose pointer.  A drop of Ozempic will appear at the needle tip. If no drop appears, repeat Step 2 above up to 6 times.  \par Step 3:\par --Inject Medication	\par  Continue turning the dose selector until the dose counter shows your dose (0.25 mg or 0.5 mg).  	\par  Choose your injection site and wipe the skin with an alcohol swab. Let the injection site dry before you inject your dose.  	\par  Insert the needle into your skin as your healthcare provider has shown you. Make sure you can see the dose counter.  	\par  Press and hold down the dose button until the dose counter shows 0.  	\par  Keep the needle in your skin after the dose counter has returned to 0 and count slowly to 6.  	\par  Remove the needle from your skin.  	\par  Always watch the dose counter to know how many mg you inject. Hold the dose button down until the dose counter shows 0. 	\par  Carefully remove the needle from the pen.  	\par  Place the needle in a sharps container  	\par  Put the pen cap on your pen after each use to protect Ozempic from light.  	\par
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
## 2022-08-22 NOTE — PHYSICAL EXAM
[Alert] : alert [Well Nourished] : well nourished [No Acute Distress] : no acute distress [Well Developed] : well developed [Normal Sclera/Conjunctiva] : normal sclera/conjunctiva [EOMI] : extra ocular movement intact [No Proptosis] : no proptosis [Normal Oropharynx] : the oropharynx was normal [Thyroid Not Enlarged] : the thyroid was not enlarged [No Thyroid Nodules] : no palpable thyroid nodules [No Respiratory Distress] : no respiratory distress [No Accessory Muscle Use] : no accessory muscle use [Clear to Auscultation] : lungs were clear to auscultation bilaterally [Normal S1, S2] : normal S1 and S2 [Normal Rate] : heart rate was normal [Regular Rhythm] : with a regular rhythm [No Edema] : no peripheral edema [Pedal Pulses Normal] : the pedal pulses are present [Normal Bowel Sounds] : normal bowel sounds [Not Tender] : non-tender [Not Distended] : not distended [Soft] : abdomen soft [Normal Anterior Cervical Nodes] : no anterior cervical lymphadenopathy [Normal Posterior Cervical Nodes] : no posterior cervical lymphadenopathy [No Spinal Tenderness] : no spinal tenderness [Spine Straight] : spine straight [No Stigmata of Cushings Syndrome] : no stigmata of Cushings Syndrome [Normal Gait] : normal gait [Normal Strength/Tone] : muscle strength and tone were normal [No Rash] : no rash [Normal Reflexes] : deep tendon reflexes were 2+ and symmetric [No Tremors] : no tremors [Oriented x3] : oriented to person, place, and time [Acanthosis Nigricans] : no acanthosis nigricans [de-identified] : left sided large hernia

## 2022-08-24 ENCOUNTER — APPOINTMENT (OUTPATIENT)
Dept: VASCULAR SURGERY | Facility: CLINIC | Age: 67
End: 2022-08-24

## 2022-08-24 VITALS
TEMPERATURE: 98 F | DIASTOLIC BLOOD PRESSURE: 74 MMHG | OXYGEN SATURATION: 98 % | SYSTOLIC BLOOD PRESSURE: 123 MMHG | HEART RATE: 74 BPM

## 2022-08-24 PROCEDURE — 36475 ENDOVENOUS RF 1ST VEIN: CPT | Mod: LT

## 2022-08-24 NOTE — REASON FOR VISIT
[Procedure: _________] : a [unfilled] procedure visit [FreeTextEntry1] : Patient has severe reflux left gsv and here for left gsv RFA procedure.  MARCIN BARROW proceeded with left gsv RFA with no complications.  MARCIN BARROW will follow up in one week for routine visit with VLE.\par

## 2022-08-24 NOTE — PROCEDURE
[FreeTextEntry1] : left leg gsv RFA [FreeTextEntry3] : Left  Lower extremity varicose veins with inflammation, fatigue, heaviness, pain, swelling, cramping heaviness, and dermatitis.  Pt was diagnosed with Chronic Venous insufficiency (CVI), chronic venous hypertension and venous reflux which requires thermal ablation with endovenous radiofrequency to treat the truncal/axial vein reflux (Radiofrequency Ablation, RFA).\par \par Ultrasound examination demonstrated reflux in the left gsv vein with reflux into the patient’s varicosities.  A trial of compression stockings, exercise, elevation, and pain medication was attempted without relief and as such, this patient was offered RFA as definitive treatment.  After explaining risks and benefits, informed consent was obtained and the patient was brought to the treatment room.\par \par The patient was escorted to the procedure room and placed in the supine position on the examination table.   The left leg was prepped and draped in the usual sterile fashion and time-out was called.  A sonographic probe was placed into a sterile sheath and the lower extremity was imaged. A suitable access site in the superficial truncal vein to be treated was identified using sonographic guidance and marked at the skin level. 1 mL of 1% lidocaine without epinephrine was administered subcutaneously using a 25G needle.  The RF catheter, dilator and introducer were flushed with saline and wiped down and placed on the sterile field.\par \par Using standard Seldinger technique, access to the saphenous vein was obtained with the needle and corresponding guidewire.  The needle was removed and the 7Fr introducer sheath with dilator was advanced over the wire into the vein. The guide wire and dilator were removed and the RFA fiber catheter was placed into the vein through the introducer sheath.  The position of the RFA  2 cm below the Sapheno-femoral Junction was verified using ultrasound guidance.     \par \par Local tumescent anesthesia, under ultrasound guidance, was administered circumferentially around the entire length of vein in the perivenous compartment to ensure a complete “halo” of anesthetic around the vein. \par Tumescent anesthesia:  250 mL 0.9% Sodium Chloride was poured into a sterile blue basin and 50 cc of sterile injectable lidocaine 1% (without Epinephrine) was added using a 20 mL syringe.\par \par The RFA location was again confirmed to be appropriate and the RFA energy was applied by pressing the hand button.  The proximal 7 cm was treated for 2 cycles at 20 seconds. The RFA fiber was then withdrawn in sequential 6.5 cm segments and each section was treated with one cycle of 20 seconds thermal energy application. The system was monitored to ensure that the vein wall temperature was within 120 +/- 5 degrees C and the generator output was well below its maximum starting power. \par \par The operation of the RFA was ceased when the fiber end was at its most distal marker as indicated by the distal tip dutton lines.  The sheath and RFA fiber catheter were removed together and manual pressure was applied at access site for hemostasis.  Post procedure, the vein was imaged and showed successful closure along the entire treated length with a patent proximal most 2-3 cm and sapheno-femoral junction.  Dry bandaid was applied to the access site and a compression stocking 20 - 30 mm Hg was applied to the treated extremity.  The patient tolerated the procedure well with no complications.  Vital signs stable, patient was monitored throughout procedure.  \par \par Refer to procedure sheet for procedure start and end time, # of total cycles,  total treated vein length treated,  and total treatment duration time documentation. \par \par Post-Op Instructions:  The following instructions were reviewed with the patient and a print out of the instructions provided to patient at time of visit:   1)  Compression stocking to be worn 24 hours per day x 7 days.  2)  Do not get the stocking wet for the first 3 days.  3)  Ambulation immediately following procedure and as much as possible for the first 7 days.  4)  Contact the office if any questions or concerns. 5)  Patient must follow-up within the first week for post procedure reflux study performed in office.  Reviewed with the patient the follow up visit is to ensure that there are no complications such as a deep vein thrombosis (DVT) or endovenous heat-induced thrombus (EHIT).  Patient acknowledged understanding of post-op instructions and was provided with print out of instructions at time of visit.  \par \par Reviewed with the patient post-procedure RFA  instructions, provided patient with printed copy of instructions along with Dr. Pacheco’s cell phone number:  [phone], and advised patient to call for any questions or concerns prior to follow up visit.  All questions answered. \par \par

## 2022-08-31 ENCOUNTER — APPOINTMENT (OUTPATIENT)
Dept: VASCULAR SURGERY | Facility: CLINIC | Age: 67
End: 2022-08-31

## 2022-08-31 VITALS
HEART RATE: 79 BPM | OXYGEN SATURATION: 98 % | TEMPERATURE: 98.7 F | SYSTOLIC BLOOD PRESSURE: 150 MMHG | DIASTOLIC BLOOD PRESSURE: 85 MMHG

## 2022-08-31 DIAGNOSIS — I83.892 VARICOSE VEINS OF LEFT LOWER EXTREMITY WITH OTHER COMPLICATIONS: ICD-10-CM

## 2022-08-31 DIAGNOSIS — I83.812 VARICOSE VEINS OF LEFT LOWER EXTREMITY WITH PAIN: ICD-10-CM

## 2022-08-31 DIAGNOSIS — I83.12 VARICOSE VEINS OF LEFT LOWER EXTREMITY WITH INFLAMMATION: ICD-10-CM

## 2022-08-31 PROCEDURE — 93971 EXTREMITY STUDY: CPT | Mod: LT

## 2022-08-31 PROCEDURE — 99213 OFFICE O/P EST LOW 20 MIN: CPT | Mod: 25

## 2022-08-31 NOTE — ASSESSMENT
[FreeTextEntry1] : pt to return for rt leg stab phlebectomy and left leg stab phlebectomy [Other: _____] : [unfilled]

## 2022-08-31 NOTE — DATA REVIEWED
[FreeTextEntry1] : RVT performed left gsv vle s/p recent RFA - vein closed no dvt no ehit no reflus sf junction compressible and patent

## 2022-08-31 NOTE — REASON FOR VISIT
[Follow-Up: _____] : a [unfilled] follow-up visit [FreeTextEntry1] : She is s/p left gsv RFA.  Patient is here today for routine follow up with VLE. Left leg ultrasound confirms no dvt, no EHIIT, no truncal reflux and left gsv is closed as desired.\par \par \par \par

## 2022-08-31 NOTE — PHYSICAL EXAM
[JVD] : no jugular venous distention  [2+] : left 2+ [Ankle Swelling (On Exam)] : present [Ankle Swelling On The Left] : moderate [Varicose Veins Of Lower Extremities] : bilaterally [] : bilaterally [Ankle Swelling Bilaterally] : severe [Purpura] : no purpura  [Petechiae] : no petechiae [Skin Ulcer] : no ulcer [Skin Induration] : induration [Alert] : alert [Oriented to Person] : oriented to person [Oriented to Place] : oriented to place [Oriented to Time] : oriented to time [Calm] : calm [de-identified] : wdwn nad [de-identified] : wnl [FreeTextEntry1] : diffuse large varicose veins b.l legs, severe venous dermatitis especially left leg [de-identified] : wnl [de-identified] : as above

## 2022-09-02 ENCOUNTER — APPOINTMENT (OUTPATIENT)
Dept: ENDOCRINOLOGY | Facility: CLINIC | Age: 67
End: 2022-09-02

## 2022-09-08 ENCOUNTER — APPOINTMENT (OUTPATIENT)
Dept: ENDOCRINOLOGY | Facility: CLINIC | Age: 67
End: 2022-09-08

## 2022-09-09 ENCOUNTER — NON-APPOINTMENT (OUTPATIENT)
Age: 67
End: 2022-09-09

## 2022-09-14 ENCOUNTER — NON-APPOINTMENT (OUTPATIENT)
Age: 67
End: 2022-09-14

## 2022-09-15 ENCOUNTER — APPOINTMENT (OUTPATIENT)
Dept: VASCULAR SURGERY | Facility: CLINIC | Age: 67
End: 2022-09-15

## 2022-09-15 VITALS
HEART RATE: 77 BPM | SYSTOLIC BLOOD PRESSURE: 145 MMHG | OXYGEN SATURATION: 99 % | DIASTOLIC BLOOD PRESSURE: 89 MMHG | TEMPERATURE: 98.4 F

## 2022-09-15 DIAGNOSIS — I83.11 VARICOSE VEINS OF RIGHT LOWER EXTREMITY WITH INFLAMMATION: ICD-10-CM

## 2022-09-15 DIAGNOSIS — I87.2 VENOUS INSUFFICIENCY (CHRONIC) (PERIPHERAL): ICD-10-CM

## 2022-09-15 DIAGNOSIS — R25.2 CRAMP AND SPASM: ICD-10-CM

## 2022-09-15 DIAGNOSIS — G89.29 PAIN IN RIGHT LEG: ICD-10-CM

## 2022-09-15 DIAGNOSIS — M79.604 PAIN IN RIGHT LEG: ICD-10-CM

## 2022-09-15 PROCEDURE — 37765 STAB PHLEB VEINS XTR 10-20: CPT | Mod: RT

## 2022-09-15 NOTE — REASON FOR VISIT
[Procedure: _________] : a [unfilled] procedure visit [FreeTextEntry1] : Patient with painful dilated branch varicose veins right leg.  She is here for right leg microphlebectomy.  MARCIN BARROW proceeded with right leg microphlebectomy without complications.  She will follow up in 2 weeks for routine visit and suture removal.\par

## 2022-09-15 NOTE — PROCEDURE
[FreeTextEntry1] : right leg stab phlebectomy [FreeTextEntry3] : Right lower extremity varicose veins with pain, swelling, and venous insufficiency.\par  \par Varicose veins of the right lower extremity with inflammation.  Venous reflux/insufficiency, leg pain, limb swelling and cramps.  Chronic venous hypertension.\par \par Ms. MARCIN BARROW is a 66 year year old F with a history of right lower extremity varicose veins previously seen in the office.  Ultrasound examination demonstrated multiple varicosities in the patient’s right leg with venous reflux.  A trial of compression stockings, exercise, elevation, and pain medication was attempted without relief and as such, this patient was offered microphlebectomy therapy.  After explaining risks and benefits, informed consent was obtained and the patient agreed to proceed.  \par \par The patient was escorted to the procedure room.  The varicose veins for treatment were marked out and the patient agreed with the markings.  Patient was placed in the   []   position on the examination table.  The right leg was prepped and draped in the usual sterile fashion and time-out was called.  \par \par Local anesthesia was administered subcutaneously along the marked varicosities for treatment.  Anesthesia:  1% lidocaine without epinephrine.  18G blunt fill needle with 5 micron filter is removed and 25G 11/2 inch needle placed on syringe with medication for injection.  \par \par Total dose of lidocaine without epinephrine:  20 cc\par \par  A total of  12  micro incisions were made over the varicosities that were previously marked out and the veins were grasped using a combination of mosquitoes and forceps.  The veins were removed.  At completion, hemostasis was ensured with digital pressure at the stab sites.  Once complete, the incisions were appropriately closed with 4-0 Monocryl suture and steri-strips.  The right leg was appropriately dressed with 4 inch Kerlix and 4 inch latex-free Ace bandage.  The patient tolerated the procedure well with no complications.  Vital signs stable, patient was monitored throughout.  Patient was escorted to the changing room.  Specimen sent to pathology for evaluation.\par \par Post-Op Instructions:  The following instructions were reviewed with the patient and a hard copy of the instructions provided:   1)  Surgical dressing to remain on for 72 hours.   2)  Do not get the surgical dressing wet for the first 3 days.  3)  Ambulation immediately following procedure and as much as possible.  4)  Contact the office if any questions or concerns; follow-up 2 weeks post-op for evaluation and suture removal.  Patient acknowledged understanding of post-op instructions and was provided with print out of instructions at time of visit.  Provided patient with Dr. Pacheco’s cell number:  [phone].  All questions answered.\par \par

## 2022-09-16 ENCOUNTER — APPOINTMENT (OUTPATIENT)
Dept: DERMATOLOGY | Facility: CLINIC | Age: 67
End: 2022-09-16

## 2022-09-21 ENCOUNTER — APPOINTMENT (OUTPATIENT)
Dept: ENDOCRINOLOGY | Facility: CLINIC | Age: 67
End: 2022-09-21

## 2022-09-21 ENCOUNTER — NON-APPOINTMENT (OUTPATIENT)
Age: 67
End: 2022-09-21

## 2022-09-21 VITALS — WEIGHT: 187 LBS | BODY MASS INDEX: 39.25 KG/M2 | HEIGHT: 58 IN

## 2022-09-21 PROCEDURE — 97803 MED NUTRITION INDIV SUBSEQ: CPT

## 2022-09-23 ENCOUNTER — NON-APPOINTMENT (OUTPATIENT)
Age: 67
End: 2022-09-23

## 2022-09-23 ENCOUNTER — APPOINTMENT (OUTPATIENT)
Dept: ENDOCRINOLOGY | Facility: CLINIC | Age: 67
End: 2022-09-23

## 2022-09-29 ENCOUNTER — APPOINTMENT (OUTPATIENT)
Dept: VASCULAR SURGERY | Facility: CLINIC | Age: 67
End: 2022-09-29

## 2022-09-29 VITALS
TEMPERATURE: 97.2 F | DIASTOLIC BLOOD PRESSURE: 82 MMHG | SYSTOLIC BLOOD PRESSURE: 150 MMHG | HEART RATE: 89 BPM | OXYGEN SATURATION: 99 %

## 2022-09-29 DIAGNOSIS — I83.891 VARICOSE VEINS OF RIGHT LOWER EXTREMITY WITH OTHER COMPLICATIONS: ICD-10-CM

## 2022-09-29 DIAGNOSIS — L03.115 CELLULITIS OF RIGHT LOWER LIMB: ICD-10-CM

## 2022-09-29 DIAGNOSIS — I83.811 VARICOSE VEINS OF RIGHT LOWER EXTREMITY WITH PAIN: ICD-10-CM

## 2022-09-29 PROCEDURE — 99213 OFFICE O/P EST LOW 20 MIN: CPT

## 2022-09-29 NOTE — REASON FOR VISIT
[Follow-Up: _____] : a [unfilled] follow-up visit [FreeTextEntry1] : sutures removed - well healed with one incision with slight surrounding cellulitis - started Keflex 500 mg po BID x 7 days - pt to followup one week. No fever and no crepitus or drainage.

## 2022-09-29 NOTE — PHYSICAL EXAM
[JVD] : no jugular venous distention  [2+] : left 2+ [Ankle Swelling (On Exam)] : present [Ankle Swelling On The Left] : moderate [Varicose Veins Of Lower Extremities] : bilaterally [] : bilaterally [Ankle Swelling Bilaterally] : severe [Purpura] : no purpura  [Petechiae] : no petechiae [Skin Ulcer] : no ulcer [Skin Induration] : induration [Alert] : alert [Oriented to Person] : oriented to person [Oriented to Place] : oriented to place [Oriented to Time] : oriented to time [Calm] : calm [de-identified] : wdwn nad [de-identified] : wnl [FreeTextEntry1] : right lower leg with several small stab incisions s/p phlebectomy - one incision with slight surrounding erythema [de-identified] : wnl [de-identified] : as above

## 2022-09-29 NOTE — VITALS
[Dull] : dull [Aching] : aching [Tender] : tender [FreeTextEntry3] : right lower leg one incision with slight surrounding cellulitis

## 2022-10-03 ENCOUNTER — APPOINTMENT (OUTPATIENT)
Dept: VASCULAR SURGERY | Facility: CLINIC | Age: 67
End: 2022-10-03

## 2022-10-06 ENCOUNTER — APPOINTMENT (OUTPATIENT)
Dept: VASCULAR SURGERY | Facility: CLINIC | Age: 67
End: 2022-10-06

## 2022-10-14 ENCOUNTER — APPOINTMENT (OUTPATIENT)
Dept: ENDOCRINOLOGY | Facility: CLINIC | Age: 67
End: 2022-10-14

## 2022-10-17 ENCOUNTER — APPOINTMENT (OUTPATIENT)
Dept: VASCULAR SURGERY | Facility: CLINIC | Age: 67
End: 2022-10-17

## 2022-11-04 ENCOUNTER — APPOINTMENT (OUTPATIENT)
Dept: DERMATOLOGY | Facility: CLINIC | Age: 67
End: 2022-11-04

## 2022-11-04 DIAGNOSIS — L80 VITILIGO: ICD-10-CM

## 2022-11-04 DIAGNOSIS — L20.89 OTHER ATOPIC DERMATITIS: ICD-10-CM

## 2022-11-04 PROCEDURE — 99213 OFFICE O/P EST LOW 20 MIN: CPT

## 2022-11-04 RX ORDER — MOMETASONE FUROATE 1 MG/G
0.1 OINTMENT TOPICAL TWICE DAILY
Qty: 2 | Refills: 2 | Status: ACTIVE | COMMUNITY
Start: 2022-11-04 | End: 1900-01-01

## 2022-11-04 NOTE — HISTORY OF PRESENT ILLNESS
[FreeTextEntry1] : rash [de-identified] : had a severe rash\par pt had vein stripping surgery\par an area got infected\par she was given keflex\par the pharmacist intervened and said not to give it because she has a PCN allergy\par but then they changed to amoxicillin (Augmentin) and it went through\par pt had a terrible reaction\par had to go to ER\par rash and peeling all over\par got prednisone and TAC

## 2022-11-04 NOTE — ASSESSMENT
[FreeTextEntry1] : vitiligo vs dermatitis with chronic depigmentation from rubbing/scratching\par on legs\par will restart mometasone\par consider systemic tx\par \par avoid PCN in the future!

## 2022-11-08 ENCOUNTER — APPOINTMENT (OUTPATIENT)
Dept: ENDOCRINOLOGY | Facility: CLINIC | Age: 67
End: 2022-11-08

## 2022-11-08 VITALS
BODY MASS INDEX: 38.46 KG/M2 | WEIGHT: 184 LBS | HEART RATE: 80 BPM | SYSTOLIC BLOOD PRESSURE: 144 MMHG | DIASTOLIC BLOOD PRESSURE: 67 MMHG

## 2022-11-08 PROCEDURE — 97803 MED NUTRITION INDIV SUBSEQ: CPT

## 2022-11-08 PROCEDURE — 99214 OFFICE O/P EST MOD 30 MIN: CPT | Mod: 25

## 2022-11-08 PROCEDURE — 82962 GLUCOSE BLOOD TEST: CPT

## 2022-11-08 NOTE — ASSESSMENT
[Carbohydrate Consistent Diet] : carbohydrate consistent diet [Importance of Diet and Exercise] : importance of diet and exercise to improve glycemic control, achieve weight loss and improve cardiovascular health [Self Monitoring of Blood Glucose] : self monitoring of blood glucose [Weight Loss] : weight loss [Other____] : Risks and benefits of [unfilled] was discussed with the patient.

## 2022-11-11 ENCOUNTER — APPOINTMENT (OUTPATIENT)
Dept: ENDOCRINOLOGY | Facility: CLINIC | Age: 67
End: 2022-11-11

## 2022-11-11 NOTE — END OF VISIT
[FreeTextEntry3] : All medical record entries made by the Scribe were at my, Dr. Dickson Healy, direction and personally dictated by me on 11/08/2022. I have reviewed the chart and agree that the record accurately reflects my personal performance of the history, physical exam, assessment and plan. I have also personally, directed, reviewed and agreed with the chart  [Time Spent: ___ minutes] : I have spent [unfilled] minutes of time on the encounter.

## 2022-11-11 NOTE — ADDENDUM
[FreeTextEntry1] : I, Megan Cope, acted solely as a scribe for Dr. Dickson Healy on this date 11/08/2022

## 2022-11-11 NOTE — THERAPY
[Today's Date] : [unfilled] [Lantus] : Lantus [Humalog] : Humalog [FreeTextEntry9] : 33 u  [de-identified] : 12 u  [FreeTextEntry7] : Atorvastatin 20 mg qd
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
## 2022-11-11 NOTE — DATA REVIEWED
[FreeTextEntry1] : Labs:  \par - 10/14/22: TSH 2.0, LDL-c 109, A1c 7.4%, s.creat 1.13, ALT 28 \par - 08/22/22: POCT A1c 7.6%\par - 06/29/22: A1c 8.7% (H), \par - 05/12/22: Salivary Cortisol: Midnight cortisol < 50, A1c 9.9% (H), s.creat 0.70, Glucose 209 (H), ACR ratio 20, Cortisol AM 9.5, LDL-c 129 (H),  (H), cholesterol 224 (H), TSH 3.62\par - 3/11/22: A1c 8.9%, s.creat 0.82, ALT 13, LDL-c 135, hematocrit 37.6, Vit D 25-OH 28.3 \par - 3/07/18: A1C 11.5% 0.59 Ca 9.4  vit D 14.9 TSH 2.13, urine no protein

## 2022-11-11 NOTE — PHYSICAL EXAM
[Alert] : alert [Well Nourished] : well nourished [No Acute Distress] : no acute distress [Well Developed] : well developed [Normal Sclera/Conjunctiva] : normal sclera/conjunctiva [EOMI] : extra ocular movement intact [No Proptosis] : no proptosis [Normal Outer Ear/Nose] : the ears and nose were normal in appearance [Normal Oropharynx] : the oropharynx was normal [Thyroid Not Enlarged] : the thyroid was not enlarged [No Thyroid Nodules] : no palpable thyroid nodules [No Respiratory Distress] : no respiratory distress [No Accessory Muscle Use] : no accessory muscle use [Clear to Auscultation] : lungs were clear to auscultation bilaterally [Normal S1, S2] : normal S1 and S2 [Normal Rate] : heart rate was normal [Regular Rhythm] : with a regular rhythm [No Edema] : no peripheral edema [Pedal Pulses Normal] : the pedal pulses are present [Normal Bowel Sounds] : normal bowel sounds [Not Tender] : non-tender [Not Distended] : not distended [Soft] : abdomen soft [Normal Anterior Cervical Nodes] : no anterior cervical lymphadenopathy [No Spinal Tenderness] : no spinal tenderness [Spine Straight] : spine straight [No Stigmata of Cushings Syndrome] : no stigmata of Cushings Syndrome [Normal Gait] : normal gait [Normal Strength/Tone] : muscle strength and tone were normal [No Rash] : no rash [Right foot was examined, including] : right foot ~C was examined, including visual inspection with sensory and pulse exams [Left foot was examined, including] : left foot ~C was examined, including visual inspection with sensory and pulse exams [2+] : 2+ in the dorsalis pedis [Vibration Dec.] : diminished vibratory sensation at the level of the toes [Normal Reflexes] : deep tendon reflexes were 2+ and symmetric [No Tremors] : no tremors [Oriented x3] : oriented to person, place, and time [Acanthosis Nigricans] : no acanthosis nigricans [de-identified] : remarkable supraclavicular fat pads, cervical fat pad [de-identified] : moon facies [de-identified] : Varicose veins. b/l chin discoloration

## 2022-11-11 NOTE — REVIEW OF SYSTEMS
[Recent Weight Loss (___ Lbs)] : recent weight loss: [unfilled] lbs [Negative] : Heme/Lymph [FreeTextEntry2] : She lost 3 lbs in 3 months.

## 2022-11-18 ENCOUNTER — APPOINTMENT (OUTPATIENT)
Dept: ENDOCRINOLOGY | Facility: CLINIC | Age: 67
End: 2022-11-18

## 2022-11-23 RX ORDER — ATORVASTATIN CALCIUM 40 MG/1
40 TABLET, FILM COATED ORAL DAILY
Qty: 90 | Refills: 1 | Status: ACTIVE | COMMUNITY
Start: 2022-05-16 | End: 1900-01-01

## 2022-12-26 LAB — GLUCOSE BLDC GLUCOMTR-MCNC: 117

## 2023-03-09 ENCOUNTER — APPOINTMENT (OUTPATIENT)
Dept: ENDOCRINOLOGY | Facility: CLINIC | Age: 68
End: 2023-03-09

## 2023-03-28 PROBLEM — D17.0 LIPOMA OF SKIN AND SUBCUTANEOUS TISSUE OF NECK: Status: ACTIVE | Noted: 2023-03-28

## 2023-03-28 PROBLEM — R43.8 HYPOGEUSIA: Status: ACTIVE | Noted: 2022-04-27

## 2023-03-28 NOTE — END OF VISIT
[FreeTextEntry3] : All medical record entries made by the Scribe were at my, Dr. Sandra Avila, direction and personally dictated by me on 06/29/2022. I have reviewed the chart and agree that the record accurately reflects my personal performance of the history, physical exam, assessment and plan. I have also personally directed, reviewed, and agreed with the chart.
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
## 2023-03-28 NOTE — PHYSICAL EXAM
[FreeTextEntry1] : No hoarseness.  [de-identified] : Left supraclavicular swelling/mass 6 cm, nontender, doughy, suggestive of lipoma.  [Normal] : no masses and lesions seen, face is symmetric

## 2023-03-28 NOTE — ADDENDUM
[FreeTextEntry1] : Documented by Francoise Harry acting as a scribe for Dr. Sandra Avila on 06/29/2022.
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
## 2023-04-20 ENCOUNTER — APPOINTMENT (OUTPATIENT)
Dept: ENDOCRINOLOGY | Facility: CLINIC | Age: 68
End: 2023-04-20
Payer: MEDICARE

## 2023-04-20 PROCEDURE — 99214 OFFICE O/P EST MOD 30 MIN: CPT | Mod: 25

## 2023-04-20 PROCEDURE — 82962 GLUCOSE BLOOD TEST: CPT

## 2023-04-21 NOTE — HISTORY OF PRESENT ILLNESS
[FreeTextEntry1] : 66 y/o female pt, with Hx of T2DM (dx approx. 20 years ago), with no known DM related complications, presents today for endocrine evaluation. \par Pt has Hx of multiple abdominal surgical repairs. Hx of chronic sinus infection. \par Other PMHx: L hand carpal tunnel syndrome (dx 10 years ago, took steroids for "short period of time"), abdominal hernia, vitiligo \par PSHx: hysterectomy, R leg varicose vein surgery (5 years ago, "18 veins removed from R leg")\par FHx: DM (mother, father, 1 brother)\par No FHx of premature heart disease. Pt has 1 living sibling.\par SHx:non-smoker, no etoh use \par Last funduscopic visit: 3/2022. 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 Pt was informed she does not have diabetic retinopathy. \par Last dental visit: 2020 \par Allergies: Penicillin \par No children. Pt does not remember age of menopause. \par Pt has received 3 doses of COVID vaccine. \par \par \par \par 11/08/2022\par Pt presents today for DM f/u with POCT 117, /67 and BMI 38.46. She lost 3 lbs in 3 months. \par Today, pt is feeling well with no physical complaints. She brought lab reports from 10/14/22, (Refer to Data Narrative). She notes she had an allergic reaction to the medication Amoxicillin and endorses itching/burning skin reaction, swollen face, and peeling from head to toe from this. As a result, she was sick and resting for one month. She is up to date on ophthalmologist exam: she was told she does not have DM in eyes.\par \par 04/20/2023\par Pt has POCT glucose 133 /67, lost 3 pounds in the pas 5 months.\par She is back home after 3 months been overse\par Pt is doing well, with no physical complaints.\par Pt had surgery in 09/2022 on both legs for veins.\par Endorses occasional hypoglycemias, LE weakness.\par \par \par 03/24 A1C 7.1%, s.creat 0.7, ALT 12, LDL-c 74\par \par She in on Farxiga 5 mg, Ozempic 1 mg( run out > 3 months), Humalog 16-18 u, Lantus 35-40 u, Atorvastatin 10 mg,\par \par Medications modified: Restart Ozempic 0.5 mg (decreased from 1 mg 04/20/23)\par \par \par \par 03/3023 A1C 7.1%\par \par [place].\par \par She ran out of Ozempic.\par \par She is on Farxiga 5 mg, MDI,\par \par Plan:\par \par Recommend decreasing caloric consumption and increase physical activity.\par \par Follow up\par \par [Medications verified as per pt on 04/20/2023) \par Current Medications: Lantus 33 u qd, Humalog 12 u ac, Farxiga 5 mg qd (initiated on 07/05/22), Ozempic 1 mg qw, Atorvastatin 10 mg qd, Ozempic 1 qw, ASA 81 mg qd\par \par Medication modified/added this visit: Lantus 33 u decreased from 45 u on 11/08/2022, Humalog 12 u (decreased from 16-20 u on 11/08/2022 ) Ozempic 1 qw increased from 0.5 mg on 11/08/2022\par Restarted  Aspirin 81 mg qd on 11/08/2022.

## 2023-04-21 NOTE — PHYSICAL EXAM
[Alert] : alert [Well Nourished] : well nourished [No Acute Distress] : no acute distress [Well Developed] : well developed [Normal Sclera/Conjunctiva] : normal sclera/conjunctiva [EOMI] : extra ocular movement intact [No Proptosis] : no proptosis [Normal Outer Ear/Nose] : the ears and nose were normal in appearance [Normal Oropharynx] : the oropharynx was normal [Thyroid Not Enlarged] : the thyroid was not enlarged [No Thyroid Nodules] : no palpable thyroid nodules [No Respiratory Distress] : no respiratory distress [No Accessory Muscle Use] : no accessory muscle use [Clear to Auscultation] : lungs were clear to auscultation bilaterally [Normal S1, S2] : normal S1 and S2 [Normal Rate] : heart rate was normal [Regular Rhythm] : with a regular rhythm [No Edema] : no peripheral edema [Pedal Pulses Normal] : the pedal pulses are present [Normal Bowel Sounds] : normal bowel sounds [Not Tender] : non-tender [Not Distended] : not distended [Soft] : abdomen soft [Normal Anterior Cervical Nodes] : no anterior cervical lymphadenopathy [No Spinal Tenderness] : no spinal tenderness [Spine Straight] : spine straight [No Stigmata of Cushings Syndrome] : no stigmata of Cushings Syndrome [Normal Gait] : normal gait [Normal Strength/Tone] : muscle strength and tone were normal [No Rash] : no rash [Right foot was examined, including] : right foot ~C was examined, including visual inspection with sensory and pulse exams [Left foot was examined, including] : left foot ~C was examined, including visual inspection with sensory and pulse exams [2+] : 2+ in the dorsalis pedis [Vibration Dec.] : diminished vibratory sensation at the level of the toes [Normal Reflexes] : deep tendon reflexes were 2+ and symmetric [No Tremors] : no tremors [Oriented x3] : oriented to person, place, and time [Acanthosis Nigricans] : no acanthosis nigricans [de-identified] : remarkable supraclavicular fat pads, cervical fat pad [de-identified] : moon facies [de-identified] : Varicose veins. b/l chin discoloration

## 2023-04-21 NOTE — ADDENDUM
[FreeTextEntry1] : I, Lynette Barakat act soley as a scribe for Dr. Dickson Healy on this date. 04/20/2023

## 2023-04-21 NOTE — THERAPY
[Today's Date] : [unfilled] [Lantus] : Lantus [Humalog] : Humalog [FreeTextEntry9] : 33 u  [de-identified] : 12 u  [FreeTextEntry7] : Atorvastatin 20 mg qd

## 2023-04-21 NOTE — END OF VISIT
[FreeTextEntry3] : All medical record entries made by the Scribe were at my, Dr. Dickson Healy, direction and personally dictated by me on 04/20/2023. I have reviewed the chart and agree that the record accurately reflects my personal performance of the history, physical exam, assessment and plan. I have also personally directed, reviewed and agreed with the chart.  [Time Spent: ___ minutes] : I have spent [unfilled] minutes of time on the encounter.

## 2023-05-02 LAB — GLUCOSE BLDC GLUCOMTR-MCNC: 133

## 2023-05-10 ENCOUNTER — NON-APPOINTMENT (OUTPATIENT)
Age: 68
End: 2023-05-10

## 2023-05-12 ENCOUNTER — NON-APPOINTMENT (OUTPATIENT)
Age: 68
End: 2023-05-12

## 2023-05-19 ENCOUNTER — APPOINTMENT (OUTPATIENT)
Dept: SURGERY | Facility: CLINIC | Age: 68
End: 2023-05-19
Payer: MEDICARE

## 2023-05-19 VITALS
WEIGHT: 176.38 LBS | HEART RATE: 80 BPM | BODY MASS INDEX: 37.02 KG/M2 | TEMPERATURE: 97.3 F | DIASTOLIC BLOOD PRESSURE: 82 MMHG | OXYGEN SATURATION: 96 % | SYSTOLIC BLOOD PRESSURE: 137 MMHG | HEIGHT: 58 IN

## 2023-05-19 DIAGNOSIS — L98.8 OTHER SPECIFIED DISORDERS OF THE SKIN AND SUBCUTANEOUS TISSUE: ICD-10-CM

## 2023-05-19 PROCEDURE — 99213 OFFICE O/P EST LOW 20 MIN: CPT

## 2023-05-23 ENCOUNTER — NON-APPOINTMENT (OUTPATIENT)
Age: 68
End: 2023-05-23

## 2023-05-23 PROBLEM — L98.8 DRAINING CUTANEOUS SINUS TRACT: Status: ACTIVE | Noted: 2021-07-28
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
## 2023-05-23 NOTE — ASSESSMENT
[FreeTextEntry1] : Case discussed/pt seen with attending, . 67 year old female with complicated recurrent incisional hernia with diabetes, morbid obesity and unfavorable skin configuration. We discussed continue working on weight loss and glycemic control. Continues to be at poor candidate for surgery at this time. Continue at home wound care. Discussed calling office with any new/worsening symptoms. RTC in 3 months. \par \par Plan:\par -RTC 3 months

## 2023-05-23 NOTE — PHYSICAL EXAM
[Normal Breath Sounds] : Normal breath sounds [Normal Heart Sounds] : normal heart sounds [Alert] : alert [Oriented to Person] : oriented to person [Oriented to Place] : oriented to place [Oriented to Time] : oriented to time [Calm] : calm [JVD] : no jugular venous distention  [Abdominal Masses] : No abdominal masses [Abdomen Tenderness] : ~T ~M No abdominal tenderness [Tender] : was nontender [Enlarged] : not enlarged [de-identified] : DANG. Bonifacio. Appropriate. Comfortable. [de-identified] : Pupil reactive. She has a prosthetic globe. No Scleral Icterus. NCAT. [de-identified] : Supple. Trachea midline. No overt lymphadenopathy. No JVD [de-identified] : Soft, non tender and non distended. 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 No overt cellulitis. Massive incisional hernia with poor skin quality and pannus.

## 2023-05-25 ENCOUNTER — APPOINTMENT (OUTPATIENT)
Dept: ENDOCRINOLOGY | Facility: CLINIC | Age: 68
End: 2023-05-25

## 2023-06-06 ENCOUNTER — NON-APPOINTMENT (OUTPATIENT)
Age: 68
End: 2023-06-06

## 2023-08-18 ENCOUNTER — APPOINTMENT (OUTPATIENT)
Dept: ENDOCRINOLOGY | Facility: CLINIC | Age: 68
End: 2023-08-18
Payer: MEDICARE

## 2023-08-18 VITALS
BODY MASS INDEX: 36.16 KG/M2 | SYSTOLIC BLOOD PRESSURE: 134 MMHG | HEART RATE: 71 BPM | WEIGHT: 173 LBS | DIASTOLIC BLOOD PRESSURE: 85 MMHG

## 2023-08-18 PROCEDURE — 82962 GLUCOSE BLOOD TEST: CPT

## 2023-08-18 PROCEDURE — 99214 OFFICE O/P EST MOD 30 MIN: CPT | Mod: 25

## 2023-08-20 NOTE — DATA REVIEWED
[FreeTextEntry1] : Labs:   - 06/28/23: A1c 7.1%, LDL-c 120, s.creat 0.86 - 10/14/22: TSH 2.0, LDL-c 109, A1c 7.4%, s.creat 1.13, ALT 28  - 08/22/22: POCT A1c 7.6% - 06/29/22: A1c 8.7% (H),  - 05/12/22: Salivary Cortisol: Midnight cortisol < 50, A1c 9.9% (H), s.creat 0.70, Glucose 209 (H), ACR ratio 20, Cortisol AM 9.5, LDL-c 129 (H),  (H), cholesterol 224 (H), TSH 3.62 - 3/11/22: A1c 8.9%, s.creat 0.82, ALT 13, LDL-c 135, hematocrit 37.6, Vit D 25-OH 28.3  - 3/07/18: A1C 11.5% 0.59 Ca 9.4  vit D 14.9 TSH 2.13, urine no protein

## 2023-08-20 NOTE — ADDENDUM
[FreeTextEntry1] : I, Megan Cope, acted solely as a scribe for Dr. Dickson Healy on this date 08/18/2023

## 2023-08-20 NOTE — PHYSICAL EXAM
[Alert] : alert [Well Nourished] : well nourished [No Acute Distress] : no acute distress [Well Developed] : well developed [Normal Sclera/Conjunctiva] : normal sclera/conjunctiva [EOMI] : extra ocular movement intact [No Proptosis] : no proptosis [Normal Outer Ear/Nose] : the ears and nose were normal in appearance [Normal Oropharynx] : the oropharynx was normal [Thyroid Not Enlarged] : the thyroid was not enlarged [No Thyroid Nodules] : no palpable thyroid nodules [No Respiratory Distress] : no respiratory distress [No Accessory Muscle Use] : no accessory muscle use [Clear to Auscultation] : lungs were clear to auscultation bilaterally [Normal S1, S2] : normal S1 and S2 [Normal Rate] : heart rate was normal [Regular Rhythm] : with a regular rhythm [No Edema] : no peripheral edema [Pedal Pulses Normal] : the pedal pulses are present [Normal Bowel Sounds] : normal bowel sounds [Not Tender] : non-tender [Not Distended] : not distended [Soft] : abdomen soft [Normal Anterior Cervical Nodes] : no anterior cervical lymphadenopathy [No Spinal Tenderness] : no spinal tenderness [Spine Straight] : spine straight [No Stigmata of Cushings Syndrome] : no stigmata of Cushings Syndrome [Normal Gait] : normal gait [Normal Strength/Tone] : muscle strength and tone were normal [No Rash] : no rash [Right foot was examined, including] : right foot ~C was examined, including visual inspection with sensory and pulse exams [Left foot was examined, including] : left foot ~C was examined, including visual inspection with sensory and pulse exams [2+] : 2+ in the dorsalis pedis [Normal Reflexes] : deep tendon reflexes were 2+ and symmetric [No Tremors] : no tremors [Oriented x3] : oriented to person, place, and time [Acanthosis Nigricans] : no acanthosis nigricans [de-identified] : remarkable supraclavicular fat pads, cervical fat pad [de-identified] : moon facies [de-identified] : Varicose veins. b/l chin discoloration

## 2023-08-20 NOTE — THERAPY
[Today's Date] : [unfilled] [Lantus] : Lantus [Humalog] : Humalog [FreeTextEntry9] : 33 u  [de-identified] : 12 u  [FreeTextEntry7] : Atorvastatin 20 mg qd
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
## 2023-08-20 NOTE — END OF VISIT
[FreeTextEntry3] : All medical record entries made by the Scribe were at my, Dr. Dickson Healy, direction and personally dictated by me on 08/18/2023. I have reviewed the chart and agree that the record accurately reflects my personal performance of the history, physical exam, assessment and plan. I have also personally, directed, reviewed and agreed with the chart  [Time Spent: ___ minutes] : I have spent [unfilled] minutes of time on the encounter.

## 2023-09-22 ENCOUNTER — APPOINTMENT (OUTPATIENT)
Dept: SURGERY | Facility: CLINIC | Age: 68
End: 2023-09-22
Payer: MEDICARE

## 2023-09-22 VITALS
DIASTOLIC BLOOD PRESSURE: 76 MMHG | WEIGHT: 179.56 LBS | HEART RATE: 70 BPM | OXYGEN SATURATION: 95 % | TEMPERATURE: 97.2 F | HEIGHT: 58 IN | SYSTOLIC BLOOD PRESSURE: 145 MMHG | BODY MASS INDEX: 37.69 KG/M2

## 2023-09-22 DIAGNOSIS — K43.2 INCISIONAL HERNIA W/OUT OBSTRUCTION OR GANGRENE: ICD-10-CM

## 2023-09-22 PROCEDURE — 99215 OFFICE O/P EST HI 40 MIN: CPT

## 2023-09-28 PROBLEM — K43.2 INCISIONAL HERNIA, WITHOUT OBSTRUCTION OR GANGRENE: Status: ACTIVE | Noted: 2018-02-27

## 2023-10-06 RX ORDER — SEMAGLUTIDE 2.68 MG/ML
8 INJECTION, SOLUTION SUBCUTANEOUS
Qty: 3 | Refills: 1 | Status: ACTIVE | COMMUNITY
Start: 2022-08-22 | End: 1900-01-01

## 2023-12-11 ENCOUNTER — APPOINTMENT (OUTPATIENT)
Dept: ENDOCRINOLOGY | Facility: CLINIC | Age: 68
End: 2023-12-11

## 2023-12-23 LAB — GLUCOSE BLDC GLUCOMTR-MCNC: 148

## 2024-02-13 ENCOUNTER — APPOINTMENT (OUTPATIENT)
Dept: ENDOCRINOLOGY | Facility: CLINIC | Age: 69
End: 2024-02-13
Payer: MEDICARE

## 2024-02-13 DIAGNOSIS — E11.65 TYPE 2 DIABETES MELLITUS WITH HYPERGLYCEMIA: ICD-10-CM

## 2024-02-13 DIAGNOSIS — E66.01 MORBID (SEVERE) OBESITY DUE TO EXCESS CALORIES: ICD-10-CM

## 2024-02-13 PROCEDURE — 99214 OFFICE O/P EST MOD 30 MIN: CPT

## 2024-02-13 RX ORDER — BLOOD-GLUCOSE SENSOR
EACH MISCELLANEOUS
Qty: 6 | Refills: 3 | Status: ACTIVE | COMMUNITY
Start: 2023-08-28 | End: 1900-01-01

## 2024-02-13 RX ORDER — DAPAGLIFLOZIN 5 MG/1
5 TABLET, FILM COATED ORAL
Qty: 90 | Refills: 2 | Status: ACTIVE | COMMUNITY
Start: 2022-07-05 | End: 1900-01-01

## 2024-02-14 NOTE — DATA REVIEWED
[FreeTextEntry1] : Labs:   - 08/02/23: A1c 7.1%, LDL-c 120, ca 9.4, s.creat 0.86, Vitamin D 25 OH 28.5  - 06/28/23: A1c 7.1%, LDL-c 120, s.creat 0.86 - 10/14/22: TSH 2.0, LDL-c 109, A1c 7.4%, s.creat 1.13, ALT 28  - 08/22/22: POCT A1c 7.6% - 06/29/22: A1c 8.7% (H),  - 05/12/22: Salivary Cortisol: Midnight cortisol < 50, A1c 9.9% (H), s.creat 0.70, Glucose 209 (H), ACR ratio 20, Cortisol AM 9.5, LDL-c 129 (H),  (H), cholesterol 224 (H), TSH 3.62 - 3/11/22: A1c 8.9%, s.creat 0.82, ALT 13, LDL-c 135, hematocrit 37.6, Vit D 25-OH 28.3  - 3/07/18: A1C 11.5% 0.59 Ca 9.4  vit D 14.9 TSH 2.13, urine no protein

## 2024-02-14 NOTE — PHYSICAL EXAM
[Alert] : alert [Well Nourished] : well nourished [No Acute Distress] : no acute distress [Well Developed] : well developed [Normal Sclera/Conjunctiva] : normal sclera/conjunctiva [EOMI] : extra ocular movement intact [No Proptosis] : no proptosis [Normal Outer Ear/Nose] : the ears and nose were normal in appearance [Normal Oropharynx] : the oropharynx was normal [Thyroid Not Enlarged] : the thyroid was not enlarged [No Thyroid Nodules] : no palpable thyroid nodules [No Respiratory Distress] : no respiratory distress [No Accessory Muscle Use] : no accessory muscle use [Clear to Auscultation] : lungs were clear to auscultation bilaterally [Normal S1, S2] : normal S1 and S2 [Normal Rate] : heart rate was normal [Regular Rhythm] : with a regular rhythm [No Edema] : no peripheral edema [Pedal Pulses Normal] : the pedal pulses are present [Normal Bowel Sounds] : normal bowel sounds [Not Tender] : non-tender [Not Distended] : not distended [Normal Anterior Cervical Nodes] : no anterior cervical lymphadenopathy [Soft] : abdomen soft [No Spinal Tenderness] : no spinal tenderness [Spine Straight] : spine straight [No Stigmata of Cushings Syndrome] : no stigmata of Cushings Syndrome [Normal Gait] : normal gait [Normal Strength/Tone] : muscle strength and tone were normal [No Rash] : no rash [Right foot was examined, including] : right foot ~C was examined, including visual inspection with sensory and pulse exams [Left foot was examined, including] : left foot ~C was examined, including visual inspection with sensory and pulse exams [2+] : 2+ in the dorsalis pedis [Normal Reflexes] : deep tendon reflexes were 2+ and symmetric [No Tremors] : no tremors [Oriented x3] : oriented to person, place, and time [Acanthosis Nigricans] : no acanthosis nigricans [de-identified] : remarkable supraclavicular fat pads, cervical fat pad [de-identified] : moon facies [de-identified] : Varicose veins. b/l chin discoloration

## 2024-02-14 NOTE — THERAPY
[Today's Date] : [unfilled] [Lantus] : Lantus [Humalog] : Humalog [FreeTextEntry9] : 33 u  [de-identified] : 12 u  [FreeTextEntry7] : Atorvastatin 20 mg qd
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
## 2024-02-14 NOTE — ADDENDUM
[FreeTextEntry1] : I, Megan Cope, acted solely as a scribe for Dr. Dickson Healy on this date 02/13/2024

## 2024-02-14 NOTE — END OF VISIT
[FreeTextEntry3] : All medical record entries made by the Scribe were at my, Dr. Dickson Healy, direction and personally dictated by me on 02/13/2024. I have reviewed the chart and agree that the record accurately reflects my personal performance of the history, physical exam, assessment and plan. I have also personally, directed, reviewed and agreed with the chart  [Time Spent: ___ minutes] : I have spent [unfilled] minutes of time on the encounter.

## 2024-02-15 RX ORDER — FLASH GLUCOSE SENSOR
KIT MISCELLANEOUS
Qty: 3 | Refills: 2 | Status: DISCONTINUED | COMMUNITY
Start: 2023-05-02 | End: 2024-02-15

## 2024-02-15 RX ORDER — FLASH GLUCOSE SENSOR
KIT MISCELLANEOUS
Qty: 6 | Refills: 3 | Status: DISCONTINUED | COMMUNITY
Start: 2022-09-02 | End: 2024-02-15

## 2024-02-15 RX ORDER — BLOOD-GLUCOSE,RECEIVER,CONT
EACH MISCELLANEOUS
Qty: 1 | Refills: 1 | Status: ACTIVE | COMMUNITY
Start: 2024-02-15 | End: 1900-01-01

## 2024-02-15 RX ORDER — FLASH GLUCOSE SCANNING READER
EACH MISCELLANEOUS
Qty: 1 | Refills: 0 | Status: DISCONTINUED | COMMUNITY
Start: 2023-05-02 | End: 2024-02-15

## 2024-02-15 RX ORDER — FLASH GLUCOSE SCANNING READER
EACH MISCELLANEOUS
Qty: 1 | Refills: 0 | Status: DISCONTINUED | COMMUNITY
Start: 2022-09-02 | End: 2024-02-15

## 2024-02-16 LAB
ANION GAP SERPL CALC-SCNC: 12 MMOL/L
BUN SERPL-MCNC: 14 MG/DL
CALCIUM SERPL-MCNC: 9.6 MG/DL
CHLORIDE SERPL-SCNC: 105 MMOL/L
CHOLEST SERPL-MCNC: 194 MG/DL
CO2 SERPL-SCNC: 26 MMOL/L
CREAT SERPL-MCNC: 0.69 MG/DL
CREAT SPEC-SCNC: 177 MG/DL
EGFR: 94 ML/MIN/1.73M2
ESTIMATED AVERAGE GLUCOSE: 169 MG/DL
GLUCOSE SERPL-MCNC: 102 MG/DL
HBA1C MFR BLD HPLC: 7.5 %
HDLC SERPL-MCNC: 63 MG/DL
LDLC SERPL CALC-MCNC: 114 MG/DL
MICROALBUMIN 24H UR DL<=1MG/L-MCNC: 3.4 MG/DL
MICROALBUMIN/CREAT 24H UR-RTO: 19 MG/G
NONHDLC SERPL-MCNC: 130 MG/DL
POTASSIUM SERPL-SCNC: 4.2 MMOL/L
SODIUM SERPL-SCNC: 142 MMOL/L
TRIGL SERPL-MCNC: 92 MG/DL

## 2024-03-04 ENCOUNTER — APPOINTMENT (OUTPATIENT)
Dept: HEART AND VASCULAR | Facility: CLINIC | Age: 69
End: 2024-03-04
Payer: MEDICARE

## 2024-03-04 ENCOUNTER — NON-APPOINTMENT (OUTPATIENT)
Age: 69
End: 2024-03-04

## 2024-03-04 VITALS
BODY MASS INDEX: 35.48 KG/M2 | SYSTOLIC BLOOD PRESSURE: 112 MMHG | OXYGEN SATURATION: 99 % | DIASTOLIC BLOOD PRESSURE: 72 MMHG | HEIGHT: 58 IN | HEART RATE: 97 BPM | WEIGHT: 169 LBS | TEMPERATURE: 97.2 F

## 2024-03-04 DIAGNOSIS — R07.9 CHEST PAIN, UNSPECIFIED: ICD-10-CM

## 2024-03-04 DIAGNOSIS — Z79.4 TYPE 2 DIABETES MELLITUS W/OUT COMPLICATIONS: ICD-10-CM

## 2024-03-04 DIAGNOSIS — E11.9 TYPE 2 DIABETES MELLITUS W/OUT COMPLICATIONS: ICD-10-CM

## 2024-03-04 DIAGNOSIS — E78.00 PURE HYPERCHOLESTEROLEMIA, UNSPECIFIED: ICD-10-CM

## 2024-03-04 DIAGNOSIS — I25.118 ATHEROSCLEROTIC HEART DISEASE OF NATIVE CORONARY ARTERY WITH OTHER FORMS OF ANGINA PECTORIS: ICD-10-CM

## 2024-03-04 PROCEDURE — 93306 TTE W/DOPPLER COMPLETE: CPT

## 2024-03-04 PROCEDURE — ZZZZZ: CPT | Mod: NC

## 2024-03-04 PROCEDURE — 99214 OFFICE O/P EST MOD 30 MIN: CPT

## 2024-03-04 PROCEDURE — 93000 ELECTROCARDIOGRAM COMPLETE: CPT

## 2024-03-04 RX ORDER — ERGOCALCIFEROL 1.25 MG/1
1.25 MG CAPSULE, LIQUID FILLED ORAL
Refills: 0 | Status: ACTIVE | COMMUNITY

## 2024-03-04 NOTE — DISCUSSION/SUMMARY
[FreeTextEntry1] : stable exam CAD/ cp/sob stable echo, results discussed recommend CTA to evaluate coronaries [EKG obtained to assist in diagnosis and management of assessed problem(s)] : EKG obtained to assist in diagnosis and management of assessed problem(s)

## 2024-03-04 NOTE — PHYSICAL EXAM
[Well Developed] : well developed [Well Nourished] : well nourished [No Acute Distress] : no acute distress [Normal Conjunctiva] : normal conjunctiva [Normal Venous Pressure] : normal venous pressure [No Carotid Bruit] : no carotid bruit [Normal S1, S2] : normal S1, S2 [No Murmur] : no murmur [No Rub] : no rub [No Gallop] : no gallop [Clear Lung Fields] : clear lung fields [Good Air Entry] : good air entry [No Respiratory Distress] : no respiratory distress  [Gait - Sufficient for Exercise Testing] : gait - sufficient for exercise testing [No Edema] : no edema [No Clubbing] : no clubbing [No Cyanosis] : no cyanosis [Moves all extremities] : moves all extremities [Normal Speech] : normal speech [No Focal Deficits] : no focal deficits [Normal memory] : normal memory [Alert and Oriented] : alert and oriented

## 2024-03-13 ENCOUNTER — APPOINTMENT (OUTPATIENT)
Dept: CT IMAGING | Facility: CLINIC | Age: 69
End: 2024-03-13

## 2024-10-03 ENCOUNTER — APPOINTMENT (OUTPATIENT)
Dept: ENDOCRINOLOGY | Facility: CLINIC | Age: 69
End: 2024-10-03
Payer: MEDICARE

## 2024-10-03 VITALS
HEART RATE: 87 BPM | BODY MASS INDEX: 36.94 KG/M2 | DIASTOLIC BLOOD PRESSURE: 86 MMHG | SYSTOLIC BLOOD PRESSURE: 154 MMHG | HEIGHT: 58 IN | WEIGHT: 176 LBS

## 2024-10-03 DIAGNOSIS — E66.01 MORBID (SEVERE) OBESITY DUE TO EXCESS CALORIES: ICD-10-CM

## 2024-10-03 DIAGNOSIS — E11.65 TYPE 2 DIABETES MELLITUS WITH HYPERGLYCEMIA: ICD-10-CM

## 2024-10-03 PROCEDURE — 99215 OFFICE O/P EST HI 40 MIN: CPT

## 2024-10-03 PROCEDURE — G2211 COMPLEX E/M VISIT ADD ON: CPT

## 2024-10-03 PROCEDURE — 83036 HEMOGLOBIN GLYCOSYLATED A1C: CPT | Mod: QW

## 2024-10-03 PROCEDURE — 82962 GLUCOSE BLOOD TEST: CPT

## 2024-10-04 NOTE — PHYSICAL EXAM
[No Acute Distress] : no acute distress [Normal Sclera/Conjunctiva] : normal sclera/conjunctiva [No Proptosis] : no proptosis [Normal Outer Ear/Nose] : the ears and nose were normal in appearance [Normal Oropharynx] : the oropharynx was normal [Thyroid Not Enlarged] : the thyroid was not enlarged [No Thyroid Nodules] : no palpable thyroid nodules [Clear to Auscultation] : lungs were clear to auscultation bilaterally [Normal Rate] : heart rate was normal [No Edema] : no peripheral edema [Pedal Pulses Normal] : the pedal pulses are present [Soft] : abdomen soft [Normal Anterior Cervical Nodes] : no anterior cervical lymphadenopathy [No Spinal Tenderness] : no spinal tenderness [Spine Straight] : spine straight [No Stigmata of Cushings Syndrome] : no stigmata of Cushings Syndrome [Normal Gait] : normal gait [Normal Strength/Tone] : muscle strength and tone were normal [No Rash] : no rash [Right foot was examined, including] : right foot ~C was examined, including visual inspection with sensory and pulse exams [Left foot was examined, including] : left foot ~C was examined, including visual inspection with sensory and pulse exams [2+] : 2+ in the dorsalis pedis [Normal Reflexes] : deep tendon reflexes were 2+ and symmetric [No Tremors] : no tremors [Oriented x3] : oriented to person, place, and time [Acanthosis Nigricans] : no acanthosis nigricans [de-identified] : remarkable supraclavicular fat pads, cervical fat pad [de-identified] : moon facies [de-identified] : Varicose veins. b/l chin discoloration

## 2024-10-04 NOTE — THERAPY
[Today's Date] : [unfilled] [Lantus] : Lantus [Humalog] : Humalog [FreeTextEntry9] : 33 u  [de-identified] : 12 u  [FreeTextEntry7] : Atorvastatin 20 mg qd

## 2024-10-04 NOTE — HISTORY OF PRESENT ILLNESS
[FreeTextEntry1] : 67 y/o female pt, with Hx of T2DM (dx approx. 20 years ago), with no known DM related complications, presents today for endocrine evaluation.  Pt has Hx of multiple abdominal surgical repairs. Hx of chronic sinus infection.  Other PMHx: L hand carpal tunnel syndrome (dx 10 years ago, took steroids for "short period of time"), abdominal hernia, vitiligo  PSHx: hysterectomy, R leg varicose vein surgery (5 years ago, "18 veins removed from R leg") FHx: DM (mother, father, 1 brother) No FHx of premature heart disease. Pt has 1 living sibling. SHx:non-smoker, no etoh use  Last funduscopic visit: 3/2022. 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 Pt was informed she does not have diabetic retinopathy.  Last dental visit: 2020  Allergies: Penicillin  No children. Pt does not remember age of menopause.  Pt has received 3 doses of COVID vaccine.   11/08/2022 Pt presents today for DM f/u with POCT 117, /67 and BMI 38.46. She lost 3 lbs in 3 months.  Today, pt is feeling well with no physical complaints. She brought lab reports from 10/14/22, (Refer to Data Narrative). She notes she had an allergic reaction to the medication Amoxicillin and endorses itching/burning skin reaction, swollen face, and peeling from head to toe from this. As a result, she was sick and resting for one month. She is up to date on ophthalmologist exam: she was told she does not have DM in eyes.  04/20/2023 Pt has POCT glucose 133 /67, lost 3 pounds in the pas 5 months. She is back home after 3 months been overse Pt is doing well, with no physical complaints. Pt had surgery in 09/2022 on both legs for veins. Endorses occasional hypoglycemias, LE weakness.  08/18/2023 Patient has POCT , /85 and BMI 36.16. Pt presents today feeling good with no physical complaints. She ran out of Ozempic: Last dose was ~ 08/13/23. She reports her BS sensors are not working. Continues to do therapy for her knees.  02/13/2024 Pt did not have any questions prior to the initiation of the telehealth visit.  She presents today for lab reports and recommendations. [place]. She restarted Ozempic 2 mg on 02/04/24 and took her second dose on 02/12/24. She has not been on Farxiga.  Her BS readings can be very low: 68-69, then goes up to the 200s.  Pt notes that when she first took Ozempic, she had n/v and diarrhea but now she is asymptomatic. Will see her PCP and Ophthalmologist soon.   10/03/2024  Pt has POCT 160, /86 and BMI 36.78. She gained 7 lbs in 7 months. CC: "I recently returned after being away for 4 months." Pt states that she ran out of Ozempic in 06/2024 because she was in [place] and could not receive her prescriptions. She will follow-up with her PCP later today and visited her ophthalmologist a few months ago. Pt has not followed up with her dentist in a while.  Pt states that her hernia doctor attributed her weight gain to insulin use. Pt is not taking ASA due to clotting issues.   [Medications verified as per pt on 10/03/2024] Current Medications: Lantus 40-45 u qd, Humalog 18-20 ac, Farxiga 5 mg qd (initiated on 07/05/22), Ozempic increased to 2 mg qw, Atorvastatin 40 mg qd,, ASA 81 mg qd, Vitamin D 50,000 IU, Freestyle Nam 3 Medication modified/added this visit: Ozempic 0.25 mg qw.

## 2024-10-04 NOTE — THERAPY
[Today's Date] : [unfilled] [Lantus] : Lantus [Humalog] : Humalog [FreeTextEntry9] : 33 u  [de-identified] : 12 u  [FreeTextEntry7] : Atorvastatin 20 mg qd

## 2024-10-04 NOTE — END OF VISIT
[FreeTextEntry3] :  All medical record entries made by the Scribe were at my, Dr. Dickson Healy, direction and personally dictated by me on 10/03/2024. I have reviewed the chart and agree that the record accurately reflects my personal performance of the history, physical exam, assessment and plan. I have also personally directed, reviewed and agreed with the chart. [Time Spent: ___ minutes] : I have spent [unfilled] minutes of time on the encounter which excludes teaching and separately reported services.

## 2024-10-04 NOTE — PHYSICAL EXAM
[No Acute Distress] : no acute distress [Normal Sclera/Conjunctiva] : normal sclera/conjunctiva [No Proptosis] : no proptosis [Normal Outer Ear/Nose] : the ears and nose were normal in appearance [Normal Oropharynx] : the oropharynx was normal [Thyroid Not Enlarged] : the thyroid was not enlarged [No Thyroid Nodules] : no palpable thyroid nodules [Clear to Auscultation] : lungs were clear to auscultation bilaterally [Normal Rate] : heart rate was normal [No Edema] : no peripheral edema [Pedal Pulses Normal] : the pedal pulses are present [Soft] : abdomen soft [Normal Anterior Cervical Nodes] : no anterior cervical lymphadenopathy [No Spinal Tenderness] : no spinal tenderness [Spine Straight] : spine straight [No Stigmata of Cushings Syndrome] : no stigmata of Cushings Syndrome [Normal Gait] : normal gait [Normal Strength/Tone] : muscle strength and tone were normal [No Rash] : no rash [Right foot was examined, including] : right foot ~C was examined, including visual inspection with sensory and pulse exams [Left foot was examined, including] : left foot ~C was examined, including visual inspection with sensory and pulse exams [2+] : 2+ in the dorsalis pedis [Normal Reflexes] : deep tendon reflexes were 2+ and symmetric [No Tremors] : no tremors [Oriented x3] : oriented to person, place, and time [Acanthosis Nigricans] : no acanthosis nigricans [de-identified] : remarkable supraclavicular fat pads, cervical fat pad [de-identified] : moon facies [de-identified] : Varicose veins. b/l chin discoloration

## 2024-10-04 NOTE — ADDENDUM
[FreeTextEntry1] : I, Christoph You act solely as a scribe for Dr. Dickson Healy on this date 10/03/2024

## 2024-10-09 LAB
GLUCOSE BLDC GLUCOMTR-MCNC: 160
HBA1C MFR BLD HPLC: 8.4

## 2024-11-22 ENCOUNTER — APPOINTMENT (OUTPATIENT)
Dept: SURGERY | Facility: CLINIC | Age: 69
End: 2024-11-22
Payer: MEDICARE

## 2024-11-22 VITALS
BODY MASS INDEX: 36.73 KG/M2 | WEIGHT: 175 LBS | OXYGEN SATURATION: 98 % | SYSTOLIC BLOOD PRESSURE: 133 MMHG | TEMPERATURE: 97.9 F | DIASTOLIC BLOOD PRESSURE: 78 MMHG | HEIGHT: 58 IN | HEART RATE: 69 BPM

## 2024-11-22 DIAGNOSIS — K43.2 INCISIONAL HERNIA W/OUT OBSTRUCTION OR GANGRENE: ICD-10-CM

## 2024-11-22 PROCEDURE — 99213 OFFICE O/P EST LOW 20 MIN: CPT

## 2025-03-21 ENCOUNTER — APPOINTMENT (OUTPATIENT)
Dept: SURGERY | Facility: CLINIC | Age: 70
End: 2025-03-21

## 2025-05-21 ENCOUNTER — APPOINTMENT (OUTPATIENT)
Dept: ENDOCRINOLOGY | Facility: CLINIC | Age: 70
End: 2025-05-21
Payer: MEDICARE

## 2025-05-21 DIAGNOSIS — E11.65 TYPE 2 DIABETES MELLITUS WITH HYPERGLYCEMIA: ICD-10-CM

## 2025-05-21 PROCEDURE — G2211 COMPLEX E/M VISIT ADD ON: CPT

## 2025-05-21 PROCEDURE — 36415 COLL VENOUS BLD VENIPUNCTURE: CPT

## 2025-05-21 PROCEDURE — 99214 OFFICE O/P EST MOD 30 MIN: CPT

## 2025-05-26 RX ORDER — BLOOD-GLUCOSE SENSOR
EACH MISCELLANEOUS
Qty: 6 | Refills: 2 | Status: ACTIVE | COMMUNITY
Start: 2025-05-26 | End: 1900-01-01

## 2025-05-27 LAB
ANION GAP SERPL CALC-SCNC: 12 MMOL/L
BUN SERPL-MCNC: 15 MG/DL
CALCIUM SERPL-MCNC: 9.5 MG/DL
CHLORIDE SERPL-SCNC: 107 MMOL/L
CHOLEST SERPL-MCNC: 209 MG/DL
CO2 SERPL-SCNC: 25 MMOL/L
CREAT SERPL-MCNC: 0.71 MG/DL
CREAT SPEC-SCNC: 105 MG/DL
EGFRCR SERPLBLD CKD-EPI 2021: 92 ML/MIN/1.73M2
ESTIMATED AVERAGE GLUCOSE: 163 MG/DL
GLUCOSE SERPL-MCNC: 83 MG/DL
HBA1C MFR BLD HPLC: 7.3 %
HDLC SERPL-MCNC: 53 MG/DL
LDLC SERPL-MCNC: 128 MG/DL
MICROALBUMIN 24H UR DL<=1MG/L-MCNC: <1.2 MG/DL
MICROALBUMIN/CREAT 24H UR-RTO: NORMAL MG/G
NONHDLC SERPL-MCNC: 156 MG/DL
POTASSIUM SERPL-SCNC: 4.6 MMOL/L
SODIUM SERPL-SCNC: 143 MMOL/L
TRIGL SERPL-MCNC: 160 MG/DL

## 2025-08-07 ENCOUNTER — APPOINTMENT (OUTPATIENT)
Dept: ENDOCRINOLOGY | Facility: CLINIC | Age: 70
End: 2025-08-07